# Patient Record
Sex: FEMALE | Race: WHITE | Employment: UNEMPLOYED | URBAN - METROPOLITAN AREA
[De-identification: names, ages, dates, MRNs, and addresses within clinical notes are randomized per-mention and may not be internally consistent; named-entity substitution may affect disease eponyms.]

---

## 2017-04-11 ENCOUNTER — HOSPITAL ENCOUNTER (OUTPATIENT)
Dept: PHYSICAL THERAPY | Age: 62
Discharge: HOME | End: 2017-04-11
Payer: COMMERCIAL

## 2017-04-11 PROCEDURE — 97161 PT EVAL LOW COMPLEX 20 MIN: CPT
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
## 2017-04-11 NOTE — PROGRESS NOTES
Ambulatory/Rehab Services H2 Model Falls Risk Assessment    Risk Factor Pts. ·   Confusion/Disorientation/Impulsivity  []    4 ·   Symptomatic Depression  []   2 ·   Altered Elimination  []   1 ·   Dizziness/Vertigo  []   1 ·   Gender (Male)  []   1 ·   Any administered antiepileptics (anticonvulsants):  []   2 ·   Any administered benzodiazepines:  []   1 ·   Visual Impairment (specify):  []   1 ·   Portable Oxygen Use  []   1 ·   Orthostatic ? BP  []   1 ·   History of Recent Falls (within 3 mos.)  []   5     Ability to Rise from Chair (choose one) Pts. ·   Ability to rise in a single movement  [x]   0 ·   Pushes up, successful in one attempt  []   1 ·   Multiple attempts, but successful  []   3 ·   Unable to rise without assistance  []   4   Total: (5 or greater = High Risk) 0     Falls Prevention Plan:   []                Physical Limitations to Exercise (specify):   []                Mobility Assistance Device (type):   []                Exercise/Equipment Adaptation (specify):    ©2010 [facility] [address] Patent #[id number].  Federal Law prohibits the replication, distribution or use without written permission from [facility]

## 2017-04-20 ENCOUNTER — APPOINTMENT (OUTPATIENT)
Dept: PHYSICAL THERAPY | Age: 62
End: 2017-04-20
Payer: COMMERCIAL

## 2017-04-24 ENCOUNTER — APPOINTMENT (OUTPATIENT)
Dept: PHYSICAL THERAPY | Age: 62
End: 2017-04-24
Payer: COMMERCIAL

## 2017-05-04 ENCOUNTER — HOSPITAL ENCOUNTER (OUTPATIENT)
Dept: PHYSICAL THERAPY | Age: 62
Discharge: HOME | End: 2017-05-04
Payer: COMMERCIAL

## 2017-05-04 PROCEDURE — 97110 THERAPEUTIC EXERCISES: CPT

## 2017-05-04 PROCEDURE — 97164 PT RE-EVAL EST PLAN CARE: CPT
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
## 2017-07-13 NOTE — PROGRESS NOTES
Pili Hernandez  : 1955 [facility] at [address] [place] [address]  [phone]   [phone]          OUTPATIENT PHYSICAL THERAPY:Discharge and Discontinuation Summary 2017    ICD-10: Treatment Diagnosis: Patellofemoral pain, left knee, M22.2X2  Precautions/Allergies:   Review of patient's allergies indicates no known allergies. Fall Risk Score: 0 (? 5 = High Risk)  MD Orders: Evaluate and Treat MEDICAL/REFERRING DIAGNOSIS:  Nondisplaced transverse fracture of left patella, initial encounter for closed fracture [S82.035A]  Pain in left knee [M25.562]   DATE OF ONSET:   REFERRING PHYSICIAN: Barbara York MD  RETURN PHYSICIAN APPOINTMENT: not known     INITIAL ASSESSMENT:  Pili Hernandez has been seen for 2 visits from 17 to 17 for left patella fracture. Patient has performed therapeutic exercises, activities, and had manual therapy to increased strength, ROM and function. Patient has also used modalities for pain control in order to increase function. Patient has show an increase in function per the LEFS with scores of 62/80. Patient has not returned for follow up at this time and will be discharged. She partially met her goals for therapy. Please re-order therapy if further is needed. Thank you for the referral.  Yady Amaya, PT, DPT, OCS  2017     PROBLEM LIST (Impacting functional limitations):  1. Decreased Strength  2. Decreased ADL/Functional Activities  3. Decreased Transfer Abilities  4. Decreased Ambulation Ability/Technique  5. Decreased Balance  6. Increased Pain  7. Decreased Activity Tolerance  8. Decreased Flexibility/Joint Mobility INTERVENTIONS PLANNED:  1. Balance Exercise  2. Cold  3. Electrical Stimulation  4. Manual Therapy  5. Neuromuscular Re-education/Strengthening  6. Range of Motion (ROM)  7. Therapeutic Activites  8.  Therapeutic Exercise/Strengthening   TREATMENT PLAN:  Effective Dates: 5/4/17 TO 6/4/17. Frequency/Duration: 1 time a month for follow up  GOALS: (Goals have been discussed and agreed upon with patient.)  Short-Term Functional Goals: Time Frame: 2 weeks  1. Patient will report a greater than 50% improvement in left knee symptoms in order to return to ADL's (MET)  2. Patient will show full knee flexion in order to return to all activities(MET)  3. Patient will be independent in all HEP for left knee mobility(MET)  Discharge Goals: Time Frame: 4 weeks  4. Patient will report a greater than 75% improvement in left knee symptoms in order to return to ADL's (MET  5. Patient will report going for a moderate difficulty hike without pain in order to return to activity(MET)  6. Patient will show a greater than 5 point increase on the LEFS in order to show an increase in function (not met)  7. Patient will be independent in all HEP for left knee mobility and stability (not met)  Rehabilitation Potential For Stated Goals: Excellent  Regarding Shelley Garsia therapy, I certify that the treatment plan above will be carried out by a therapist or under their direction.   Thank you for this referral,  Daphne Woods PT     Referring Physician Signature: Lia Jose MD              Date

## 2017-09-11 ENCOUNTER — APPOINTMENT (RX ONLY)
Dept: URBAN - METROPOLITAN AREA CLINIC 349 | Facility: CLINIC | Age: 62
Setting detail: DERMATOLOGY
End: 2017-09-11

## 2017-11-28 ENCOUNTER — HOSPITAL ENCOUNTER (OUTPATIENT)
Dept: PHYSICAL THERAPY | Age: 62
Discharge: HOME | End: 2017-11-28
Payer: COMMERCIAL

## 2017-11-28 PROCEDURE — 97110 THERAPEUTIC EXERCISES: CPT

## 2017-11-28 PROCEDURE — 97161 PT EVAL LOW COMPLEX 20 MIN: CPT

## 2017-11-29 NOTE — THERAPY EVALUATION
Ambulatory/Rehab Services H2 Model Falls Risk Assessment    Risk Factor Pts. ·   Confusion/Disorientation/Impulsivity  []    4 ·   Symptomatic Depression  []   2 ·   Altered Elimination  []   1 ·   Dizziness/Vertigo  []   1 ·   Gender (Male)  []   1 ·   Any administered antiepileptics (anticonvulsants):  []   2 ·   Any administered benzodiazepines:  []   1 ·   Visual Impairment (specify):  []   1 ·   Portable Oxygen Use  []   1 ·   Orthostatic ? BP  []   1 ·   History of Recent Falls (within 3 mos.)  []   5     Ability to Rise from Chair (choose one) Pts. ·   Ability to rise in a single movement  []   0 ·   Pushes up, successful in one attempt  []   1 ·   Multiple attempts, but successful  []   3 ·   Unable to rise without assistance  []   4   Total: (5 or greater = High Risk) 0     Falls Prevention Plan:   []                Physical Limitations to Exercise (specify):   []                Mobility Assistance Device (type):   []                Exercise/Equipment Adaptation (specify):    ©2010 [facility] [address] Patent #[id number].  Federal Law prohibits the replication, distribution or use without written permission from [facility] [employer]

## 2017-11-29 NOTE — THERAPY EVALUATION
Fuad Mnóica  : 1955  Primary: [facility]  Secondary:  Therapy Center at [address] [place] [address]  [phone]   [phone]       OUTPATIENT PHYSICAL THERAPY:Initial Assessment 2017    ICD-10: Treatment Diagnosis: Cervicalgia (M54.2), Pain in Left Shoulder (M25.512)  Precautions/Allergies: None noted  Fall Risk Score: 0 (? 5 = High Risk)  MD Orders: Evaluate and treat MEDICAL/REFERRING DIAGNOSIS:Neck pain [M54.2]  Shoulder pain [M25.519]  DATE OF ONSET: 3 weeks ago  REFERRING PHYSICIAN:Referred, Self, MD  RETURN PHYSICIAN APPOINTMENT: 17     INITIAL ASSESSMENT:  Mrs. Rahul Ortiz presents to physical therapy with increased pain in her neck and shoulder, as well as headaches, limited ROM and decreased strength in her upper extremities. The examination reveals a lack of mobility in the joints of her upper, middle and lower cervical spine, as well as her upper and middle thoracic spine, tightness of the soft tissue in her cervical and thoracic region, slight weakness in the upper extremities. The examination is of low complexity due to the lack of other contributing factors. Skilled physical therapy is recommended to progress the plan of care in order for the patient to return to full function with minimal symptoms. PROBLEM LIST (Impacting functional limitations):  1. Decreased Strength  2. Decreased ADL/Functional Activities  3. Increased Pain  4. Decreased Activity Tolerance  5. Decreased Flexibility/Joint Mobility  6. Decreased [place] with Home Exercise Program INTERVENTIONS PLANNED:  1. Cold  2. Heat  3. Home Exercise Program (HEP)  4. Manual Therapy  5. Range of Motion (ROM)  6. Therapeutic Exercise/Strengthening     TREATMENT PLAN:  Effective Dates: 17 TO 18.     Frequency/Duration: 2 times a week for 4 weeks  GOALS: (Goals have been discussed and agreed upon with patient.)  SHORT-TERM FUNCTIONAL GOALS: Time Frame: 2-4 wks  1. Patient will report 25-50% reduction in overall symptoms  2. Patient will be compliant with HEP and plan of care  3. Patient will have left cervical rotation ROM equal to the right  4. Patient will improve on the NDIby 3-5 points  DISCHARGE GOALS: Time Frame: 6-8 wk  1. Patient will report % reduction in overall symptoms in order to be able to have full function with decreased symptoms  2. Patient will hike for > 1hr with no pain in the shoulder or neck  3. Patient will haven no headaches during a 3-4 week period  4. Patient will improve on the NDI by 8-10 points in order to demonstrate improved function with less pain    Rehabilitation Potential For Stated Goals: Good            HISTORY:   History of Present Injury/Illness (Reason for Referral):  Mrs. Shira Inman comes to therapy following a 10-year history of on and off neck pain. She complains that the pain is throughout her neck and into her upper trapezius, as well as along her medial scapula border and in her thoracic spine. She also states that starting 3 weeks ago, she had an insidious onset of left shoulder pain. The pain is limiting her from exercises, specifically lifting weights and working on the upper extremities. The pain in her neck causes headaches that can last 1-3 days that she has at least once every 2-4 weeks. She has limited ROM of the joints in her upper, middle and lower cervical spine as well as in her upper and middle thoracic spine. She has slight weaknesses in her left upper extremities, mostly due to pain.   Past Medical History/Comorbidities: Patellar Fx  Social History/Living Environment: Lives with spouse, retired  Prior Level of Function/Work/Activity: Exercises regularly (hiking, gym)  Current Medications:   None  Date Last Reviewed:  11/29/2017   Number of Personal Factors/Comorbidities that affect the Plan of Care: 0: LOW COMPLEXITY   EXAMINATION:   (Abbreviations: *-pain, NP- no pain, wnl-within normal limits)  Observation/Orthostatic Postural Assessment:    Standing resting posture:  · Rounded shoulders, forward head, increased upper thoracic kyphosis, scapular winging, L scapular elevation, right side bend at CT junction, right convexity in the thoracic spine from CT junction-T8    Sitting resting posture:  · Rounded shoulders, forward head, increased upper thoracic kyphosis, scapular winging, L scapular elevation, right side bend at CT junction, right convexity in the thoracic spine from CT junction-T8    Palpation/tone/tissue texture:    Soft tissue:  · Upper traps: increased tone bilaterally up to occipit  · Levatore scapulae: increased tone  · Suboccipitals: Increased tone  · Thoracic Paraspinals: increased tone on left    PAIVM: (passive accessory intervertebral motion)  · C0-C1: restricted in L rotation  · C1-C2: restricted on left  · Mid Cervical: restricted on left  · Lower Cervical: restricted on left  · Upper thoracic: restricted on left  · Mid thoracic: restricted on left      ROM:  (P-pain  NP-no pain)  Cervical ROM  (tested in sitting) Date:  11-28-17       Flexion -   Extension Restricted   Rotation L Restricted   Rotation R WNL   Quadrant exten Pain on L   Quadrant flex WNL     Shoulder ROM Date:  11-28-17       Right Left   Flexion WNL WNL*   Abduction WNL WNL*   IR WNL WNL   ER WNL WNL    Thoracic ROM  (tested in sitting) Date:  11-28-17       Flexion WNL   Extension Limited   Rotation L Limited   Rotation R WNL           Strength:   Shoulder strength Date:  11-28-17       Right Left   Flexion 5 5   Abduction 5 5   IR 5 5   ER 5 5 @ neutral  4* @ 90 deg        Scapular stability Date:  11-28-17       Right Left   Mid trap WNL Weakness, substitution of upper trap   Low trap WNL Weakness   Rhomboids WNL Weakness     Cervical Strength Date:  11-28-17   Chin tuck: Able to perform, good firing             Special Tests:   Alar ligament stress test: (-) bilateral  Neurological Screen:   Myotomes: WNL         Dermatomes: WNL         Reflexes: All normal         Neural Tension Tests: ULTT (median):   Functional Mobility:    Patient has increased scapular winging with overhead motions     Body Structures Involved:  1. Joints  2. Muscles Body Functions Affected:  1. Sensory/Pain  2. Neuromusculoskeletal  3. Movement Related Activities and Participation Affected:  1. General Tasks and Demands  2. Domestic Life  3. Community, Social and [place]   Number of elements that affect the Plan of Care: 3: MODERATE COMPLEXITY   CLINICAL PRESENTATION:   Presentation: Stable and uncomplicated: LOW COMPLEXITY   CLINICAL DECISION MAKING:   Outcome Measure: Tool Used: Neck Disability Index (NDI)  Score:  Initial: 14/50  Most Recent: X/50 (Date: -- )   Interpretation of Score: The Neck Disability Index is a revised form of the Oswestry Low Back Pain Index and is designed to measure the activities of daily living in person's with neck pain. Each section is scored on a 0-5 scale, 5 representing the greatest disability. The scores of each section are added together for a total score of 50. Score 0 1-10 11-20 21-30 31-40 41-49 50   Modifier  CI CJ CK CL CM CN     Tool Used: Disabilities of the Arm, Shoulder and Hand (DASH) Questionnaire - Quick Version  Score:  Initial: 28/55  Most Recent: X/55 (Date: -- )   Interpretation of Score: The DASH is designed to measure the activities of daily living in person's with upper extremity dysfunction or pain. Each section is scored on a 1-5 scale, 5 representing the greatest disability. The scores of each section are added together for a total score of 55. Score 11 12-19 20-28 29-37 38-45 46-54 55   Modifier  CI CJ CK CL CM CN     Medical Necessity:   · Patient is expected to demonstrate progress in strength, range of motion and symptom levels to return to full function.   Reason for Services/Other Comments:  · Patient continues to require skilled intervention due to ongoing symptoms. Use of outcome tool(s) and clinical judgement create a POC that gives a: Clear prediction of patient's progress: LOW COMPLEXITY   TREATMENT:   (In addition to Assessment/Re-Assessment sessions the following treatments were rendered)    Subjective Pre-Treatment Symptoms: Patient presents to PT today complaining of increased pain and tightness in her neck and upper back, as well as in her traps and pain in her left shoulder. She states that overhead activity increases the pain in her left shoulder and that her neck pain has been on and off for 10 years. Therapeutic Exercise: (15 min) Done in order to improve strength, ROM and understanding of current condition. Date:  11-28-17   Activity/Exercise Parameters   Education Discussed examination findings, HEP, plan of care, postural corrections           Manual Therapy: (-) Done in order to improve joint and soft tissue mobility,reduce muscle guarding, and decrease muscle tone    Modalities: (-) Done in order to reduce swelling and pain    Treatment/Session Assessment:  Patient tolerated the session well. She had no increased pain during the session and understood all exam findings reported to her. She was given an HEP and plan of care were discussed. · Pain: Initial:    1-2/10 Post Session:  1-2/10 ·   Compliance with Program/Exercises: Will assess as treatment progresses. · Recommendations/Intent for next treatment session: \"Next visit will focus on progressing the patients plan of care\".   Future Appointments  Date Time Provider Poonam Zapata   11/30/2017 1:00 PM Amada Cole, PT, DPT [facility]     Total Treatment Duration: 90 min; 70 min evaluation, 15 min there-ex  PT Patient Time In/Time Out  Time In: 1400  Time Out: [address] PT, DPT

## 2017-11-30 ENCOUNTER — HOSPITAL ENCOUNTER (OUTPATIENT)
Dept: PHYSICAL THERAPY | Age: 62
Discharge: HOME | End: 2017-11-30
Payer: COMMERCIAL

## 2017-11-30 PROCEDURE — 97110 THERAPEUTIC EXERCISES: CPT

## 2017-11-30 PROCEDURE — 97140 MANUAL THERAPY 1/> REGIONS: CPT

## 2017-11-30 NOTE — PROGRESS NOTES
Bailee Herbert  : 1955  Primary: [place] [facility]  Secondary:  Therapy Center at [address] [place] [address]  [phone]   [phone]       OUTPATIENT PHYSICAL THERAPY:Daily Note 2017    ICD-10: Treatment Diagnosis: Cervicalgia (M54.2), Pain in Left Shoulder (M25.512)  Precautions/Allergies: None noted  Fall Risk Score: 0 (? 5 = High Risk)  MD Orders: Evaluate and treat MEDICAL/REFERRING DIAGNOSIS:Neck pain [M54.2]  Shoulder pain [M25.519]  DATE OF ONSET: 3 weeks ago  REFERRING PHYSICIAN:Referred, Self, MD  RETURN PHYSICIAN APPOINTMENT: 17     INITIAL ASSESSMENT:  Mrs. Tami Feldman presents to physical therapy with increased pain in her neck and shoulder, as well as headaches, limited ROM and decreased strength in her upper extremities. The examination reveals a lack of mobility in the joints of her upper, middle and lower cervical spine, as well as her upper and middle thoracic spine, tightness of the soft tissue in her cervical and thoracic region, slight weakness in the upper extremities. The examination is of low complexity due to the lack of other contributing factors. Skilled physical therapy is recommended to progress the plan of care in order for the patient to return to full function with minimal symptoms. PROBLEM LIST (Impacting functional limitations):  1. Decreased Strength  2. Decreased ADL/Functional Activities  3. Increased Pain  4. Decreased Activity Tolerance  5. Decreased Flexibility/Joint Mobility  6. Decreased [place] with Home Exercise Program INTERVENTIONS PLANNED:  1. Cold  2. Heat  3. Home Exercise Program (HEP)  4. Manual Therapy  5. Range of Motion (ROM)  6. Therapeutic Exercise/Strengthening     TREATMENT PLAN:  Effective Dates: 17 TO 18. Frequency/Duration: 2 times a week for 4 weeks  GOALS: (Goals have been discussed and agreed upon with patient.)  SHORT-TERM FUNCTIONAL GOALS: Time Frame: 2-4 wks  1. Patient will report 25-50% reduction in overall symptoms  2. Patient will be compliant with HEP and plan of care  3. Patient will have left cervical rotation ROM equal to the right  4. Patient will improve on the NDIby 3-5 points  DISCHARGE GOALS: Time Frame: 6-8 wk  1. Patient will report % reduction in overall symptoms in order to be able to have full function with decreased symptoms  2. Patient will hike for > 1hr with no pain in the shoulder or neck  3. Patient will haven no headaches during a 3-4 week period  4. Patient will improve on the NDI by 8-10 points in order to demonstrate improved function with less pain    Rehabilitation Potential For Stated Goals: Good            HISTORY:   History of Present Injury/Illness (Reason for Referral):  Mrs. Edil Ku comes to therapy following a 10-year history of on and off neck pain. She complains that the pain is throughout her neck and into her upper trapezius, as well as along her medial scapula border and in her thoracic spine. She also states that starting 3 weeks ago, she had an insidious onset of left shoulder pain. The pain is limiting her from exercises, specifically lifting weights and working on the upper extremities. The pain in her neck causes headaches that can last 1-3 days that she has at least once every 2-4 weeks. She has limited ROM of the joints in her upper, middle and lower cervical spine as well as in her upper and middle thoracic spine. She has slight weaknesses in her left upper extremities, mostly due to pain.   Past Medical History/Comorbidities: Patellar Fx  Social History/Living Environment: Lives with spouse, retired  Prior Level of Function/Work/Activity: Exercises regularly (hiking, gym)  Current Medications:   None  Date Last Reviewed:  11/30/2017   EXAMINATION:   (Abbreviations: *-pain, NP- no pain, wnl-within normal limits)  Observation/Orthostatic Postural Assessment:    Standing resting posture:  · Rounded shoulders, forward head, increased upper thoracic kyphosis, scapular winging, L scapular elevation, right side bend at CT junction, right convexity in the thoracic spine from CT junction-T8    Sitting resting posture:  · Rounded shoulders, forward head, increased upper thoracic kyphosis, scapular winging, L scapular elevation, right side bend at CT junction, right convexity in the thoracic spine from CT junction-T8    Palpation/tone/tissue texture:    Soft tissue:  · Upper traps: increased tone bilaterally up to occipit  · Levatore scapulae: increased tone  · Suboccipitals: Increased tone  · Thoracic Paraspinals: increased tone on left    PAIVM: (passive accessory intervertebral motion)  · C0-C1: restricted in L rotation  · C1-C2: restricted on left  · Mid Cervical: restricted on left  · Lower Cervical: restricted on left  · Upper thoracic: restricted on left  · Mid thoracic: restricted on left      ROM:  (P-pain  NP-no pain)  Cervical ROM  (tested in sitting) Date:  11-28-17       Flexion -   Extension Restricted   Rotation L Restricted   Rotation R WNL   Quadrant exten Pain on L   Quadrant flex WNL     Shoulder ROM Date:  11-28-17       Right Left   Flexion WNL WNL*   Abduction WNL WNL*   IR WNL WNL   ER WNL WNL    Thoracic ROM  (tested in sitting) Date:  11-28-17       Flexion WNL   Extension Limited   Rotation L Limited   Rotation R WNL           Strength:   Shoulder strength Date:  11-28-17       Right Left   Flexion 5 5   Abduction 5 5   IR 5 5   ER 5 5 @ neutral  4* @ 90 deg        Scapular stability Date:  11-28-17       Right Left   Mid trap WNL Weakness, substitution of upper trap   Low trap WNL Weakness   Rhomboids WNL Weakness     Cervical Strength Date:  11-28-17   Chin tuck: Able to perform, good firing             Special Tests:   Alar ligament stress test: (-) bilateral  Neurological Screen:   Myotomes: WNL         Dermatomes: WNL         Reflexes:  All normal         Neural Tension Tests: ULTT (median): Functional Mobility:    Patient has increased scapular winging with overhead motions     CLINICAL DECISION MAKING:   Outcome Measure: Tool Used: Neck Disability Index (NDI)  Score:  Initial: 14/50  Most Recent: X/50 (Date: -- )   Interpretation of Score: The Neck Disability Index is a revised form of the Oswestry Low Back Pain Index and is designed to measure the activities of daily living in person's with neck pain. Each section is scored on a 0-5 scale, 5 representing the greatest disability. The scores of each section are added together for a total score of 50. Score 0 1-10 11-20 21-30 31-40 41-49 50   Modifier CH CI CJ CK CL CM CN     Tool Used: Disabilities of the Arm, Shoulder and Hand (DASH) Questionnaire - Quick Version  Score:  Initial: 28/55  Most Recent: X/55 (Date: -- )   Interpretation of Score: The DASH is designed to measure the activities of daily living in person's with upper extremity dysfunction or pain. Each section is scored on a 1-5 scale, 5 representing the greatest disability. The scores of each section are added together for a total score of 55. Score 11 12-19 20-28 29-37 38-45 46-54 55   Modifier CH CI CJ CK CL CM CN     Medical Necessity:   · Patient is expected to demonstrate progress in strength, range of motion and symptom levels to return to full function. Reason for Services/Other Comments:  · Patient continues to require skilled intervention due to ongoing symptoms. TREATMENT:   (In addition to Assessment/Re-Assessment sessions the following treatments were rendered)    Subjective Pre-Treatment Symptoms: Patient reports that she had a lot of pain yesterday and isn't sure what brought that on. Today she feels pretty good. Therapeutic Exercise: (25 min) Done in order to improve strength, ROM and understanding of current condition.     Date:  11-28-17 Date  11-30-17   Activity/Exercise Parameters    Education Discussed examination findings, HEP, plan of care, postural corrections Discussed HEP   Self thoracic extension   Using foam roll at 3 spots on mid mid thoracic   Diaphragmatic breathing  Supine, 90/90 position, worked on lateral and up/down breathing   Rib cage expansion  In R side-lying, raising left arm overhead while breathing at 1:2 ratio and also with forward reaching   W's  Set shoulder blade first, 10x, 2 sets, yellow band     Manual Therapy: (30 min) Done in order to improve joint and soft tissue mobility,reduce muscle guarding, and decrease muscle tone  · Soft tissue mobilization along the left rib cage  · PA at L TP's and rotational mobilization at [facility] in the mid thoracic spine   · Rib expansion on L side, done with breathing    Modalities: (10 min) Done in order to reduce swelling and pain  · Ice to mid thoracic x 10 min  Treatment/Session Assessment:  Patient tolerated the session well. She had improved thoracic and costal cage mobility after the session. Discussed her home program.  · Pain: Initial:    Minimal symptoms at rest Post Session:  Minimal symptoms at rest ·   Compliance with Program/Exercises: Will assess as treatment progresses. · Recommendations/Intent for next treatment session: \"Next visit will focus on progressing the patients plan of care\".   Future Appointments  Date Time Provider Poonam Zapata   12/5/2017 10:30 AM Shanelle Ly PT, DPT [facility]   12/7/2017 8:30 AM Shanelle Ly PT, DPT [facility]   12/12/2017 8:30 AM Shanelle Ly PT, DPANDRE [facility]   12/19/2017 10:30 AM Shanelle Ly PT, DPT [facility]     Total Treatment Duration: 65 min  PT Patient Time In/Time Out  Time In: 1300  Time Out: 300 [facility] PT, DPT

## 2017-12-05 ENCOUNTER — HOSPITAL ENCOUNTER (OUTPATIENT)
Dept: PHYSICAL THERAPY | Age: 62
Discharge: HOME | End: 2017-12-05
Payer: COMMERCIAL

## 2017-12-05 PROCEDURE — 97140 MANUAL THERAPY 1/> REGIONS: CPT

## 2017-12-05 PROCEDURE — 97110 THERAPEUTIC EXERCISES: CPT

## 2017-12-05 NOTE — PROGRESS NOTES
Fuad Ly  : 1955  Primary: Rosita Zuñiga Ppo  Secondary:  [facility]rapy [facility] at [facility]  [address] [place] [address]  [phone]   [phone]       OUTPATIENT PHYSICAL THERAPY:Daily Note 2017    ICD-10: Treatment Diagnosis: Cervicalgia (M54.2), Pain in Left Shoulder (M25.512)  Precautions/Allergies: None noted  Fall Risk Score: 0 (? 5 = High Risk)  MD Orders: Evaluate and treat MEDICAL/REFERRING DIAGNOSIS:Neck pain [M54.2]  Shoulder pain [M25.519]  DATE OF ONSET: 3 weeks ago  REFERRING PHYSICIAN:Referred, Self, MD  RETURN PHYSICIAN APPOINTMENT: 17     INITIAL ASSESSMENT:  Mrs. Rahul Ortiz presents to physical therapy with increased pain in her neck and shoulder, as well as headaches, limited ROM and decreased strength in her upper extremities. The examination reveals a lack of mobility in the joints of her upper, middle and lower cervical spine, as well as her upper and middle thoracic spine, tightness of the soft tissue in her cervical and thoracic region, slight weakness in the upper extremities. The examination is of low complexity due to the lack of other contributing factors. Skilled physical therapy is recommended to progress the plan of care in order for the patient to return to full function with minimal symptoms. PROBLEM LIST (Impacting functional limitations):  1. Decreased Strength  2. Decreased ADL/Functional Activities  3. Increased Pain  4. Decreased Activity Tolerance  5. Decreased Flexibility/Joint Mobility  6. Decreased [place] with Home Exercise Program INTERVENTIONS PLANNED:  1. Cold  2. Heat  3. Home Exercise Program (HEP)  4. Manual Therapy  5. Range of Motion (ROM)  6. Therapeutic Exercise/Strengthening     TREATMENT PLAN:  Effective Dates: 17 TO 18. Frequency/Duration: 2 times a week for 4 weeks  GOALS: (Goals have been discussed and agreed upon with patient.)  SHORT-TERM FUNCTIONAL GOALS: Time Frame: 2-4 wks  1. Patient will report 25-50% reduction in overall symptoms  2. Patient will be compliant with HEP and plan of care  3. Patient will have left cervical rotation ROM equal to the right  4. Patient will improve on the NDIby 3-5 points  DISCHARGE GOALS: Time Frame: 6-8 wk  1. Patient will report % reduction in overall symptoms in order to be able to have full function with decreased symptoms  2. Patient will hike for > 1hr with no pain in the shoulder or neck  3. Patient will haven no headaches during a 3-4 week period  4. Patient will improve on the NDI by 8-10 points in order to demonstrate improved function with less pain    Rehabilitation Potential For Stated Goals: Good            HISTORY:   History of Present Injury/Illness (Reason for Referral):  Mrs. Clarisse Aguillon comes to therapy following a 10-year history of on and off neck pain. She complains that the pain is throughout her neck and into her upper trapezius, as well as along her medial scapula border and in her thoracic spine. She also states that starting 3 weeks ago, she had an insidious onset of left shoulder pain. The pain is limiting her from exercises, specifically lifting weights and working on the upper extremities. The pain in her neck causes headaches that can last 1-3 days that she has at least once every 2-4 weeks. She has limited ROM of the joints in her upper, middle and lower cervical spine as well as in her upper and middle thoracic spine. She has slight weaknesses in her left upper extremities, mostly due to pain.   Past Medical History/Comorbidities: Patellar Fx  Social History/Living Environment: Lives with spouse, retired  Prior Level of Function/Work/Activity: Exercises regularly (hiking, gym)  Current Medications:   None  Date Last Reviewed:  12/5/2017   EXAMINATION:   (Abbreviations: *-pain, NP- no pain, wnl-within normal limits)  Observation/Orthostatic Postural Assessment:    Standing resting posture:  · Rounded shoulders, forward head, increased upper thoracic kyphosis, scapular winging, L scapular elevation, right side bend at CT junction, right convexity in the thoracic spine from CT junction-T8    Sitting resting posture:  · Rounded shoulders, forward head, increased upper thoracic kyphosis, scapular winging, L scapular elevation, right side bend at CT junction, right convexity in the thoracic spine from CT junction-T8    Palpation/tone/tissue texture:    Soft tissue:  · Upper traps: increased tone bilaterally up to occipit  · Levatore scapulae: increased tone  · Suboccipitals: Increased tone  · Thoracic Paraspinals: increased tone on left    PAIVM: (passive accessory intervertebral motion)  · C0-C1: restricted in L rotation  · C1-C2: restricted on left  · Mid Cervical: restricted on left  · Lower Cervical: restricted on left  · Upper thoracic: restricted on left  · Mid thoracic: restricted on left      ROM:  (P-pain  NP-no pain)  Cervical ROM  (tested in sitting) Date:  11-28-17       Flexion -   Extension Restricted   Rotation L Restricted   Rotation R WNL   Quadrant exten Pain on L   Quadrant flex WNL     Shoulder ROM Date:  11-28-17       Right Left   Flexion WNL WNL*   Abduction WNL WNL*   IR WNL WNL   ER WNL WNL    Thoracic ROM  (tested in sitting) Date:  11-28-17       Flexion WNL   Extension Limited   Rotation L Limited   Rotation R WNL           Strength:   Shoulder strength Date:  11-28-17       Right Left   Flexion 5 5   Abduction 5 5   IR 5 5   ER 5 5 @ neutral  4* @ 90 deg        Scapular stability Date:  11-28-17       Right Left   Mid trap WNL Weakness, substitution of upper trap   Low trap WNL Weakness   Rhomboids WNL Weakness     Cervical Strength Date:  11-28-17   Chin tuck: Able to perform, good firing             Special Tests:   Alar ligament stress test: (-) bilateral  Neurological Screen:   Myotomes: WNL         Dermatomes: WNL         Reflexes:  All normal         Neural Tension Tests: ULTT (median):   Functional Mobility:    Patient has increased scapular winging with overhead motions     CLINICAL DECISION MAKING:   Outcome Measure: Tool Used: Neck Disability Index (NDI)  Score:  Initial: 14/50  Most Recent: X/50 (Date: -- )   Interpretation of Score: The Neck Disability Index is a revised form of the Oswestry Low Back Pain Index and is designed to measure the activities of daily living in person's with neck pain. Each section is scored on a 0-5 scale, 5 representing the greatest disability. The scores of each section are added together for a total score of 50. Score 0 1-10 11-20 21-30 31-40 41-49 50   Modifier CH CI CJ CK CL CM CN     Tool Used: Disabilities of the Arm, Shoulder and Hand (DASH) Questionnaire - Quick Version  Score:  Initial: 28/55  Most Recent: X/55 (Date: -- )   Interpretation of Score: The DASH is designed to measure the activities of daily living in person's with upper extremity dysfunction or pain. Each section is scored on a 1-5 scale, 5 representing the greatest disability. The scores of each section are added together for a total score of 55. Score 11 12-19 20-28 29-37 38-45 46-54 55   Modifier CH CI CJ CK CL CM CN     Medical Necessity:   · Patient is expected to demonstrate progress in strength, range of motion and symptom levels to return to full function. Reason for Services/Other Comments:  · Patient continues to require skilled intervention due to ongoing symptoms. TREATMENT:   (In addition to Assessment/Re-Assessment sessions the following treatments were rendered)    Subjective Pre-Treatment Symptoms: Patient reports that she did okay after our last session with minimal soreness. She also had a massage that Friday. On Saturday she developed a headache that lasted most of the day. Today she feels better. Therapeutic Exercise: (25 min) Done in order to improve strength, ROM and understanding of current condition.     Date:  11-28-17 Date  11-30-17 Date  12-5-17 Activity/Exercise Parameters     Education Discussed examination findings, HEP, plan of care, postural corrections Discussed HEP · Discussed HEP  · Discussed her sleeping posture in detail    Self thoracic extension   Using foam roll at 3 spots on mid mid thoracic Discussed and demonstrated form   Diaphragmatic breathing  Supine, 90/90 position, worked on lateral and up/down breathing discussed   Rib cage expansion  In R side-lying, raising left arm overhead while breathing at 1:2 ratio and also with forward reaching In R side-lying, raising left arm overhead while breathing at 1:2 ratio and also with forward reaching   W's  Set shoulder blade first, 10x, 2 sets, yellow band discussed   Rotator cuff stabilization    IR at 90/90: eccentric control, 10-20 se, 10x   Thoracic rotation   Bilateral, hips maximally flexed, 10x     Manual Therapy: (30 min) Done in order to improve joint and soft tissue mobility,reduce muscle guarding, and decrease muscle tone  · Soft tissue mobilization along the left rib cage  · PA at L TP's and rotational mobilization at [facility] in the mid thoracic spine   · Upper, mid and lower thoracic mobilization into extension, grade III-IV  · Rib expansion on L side, done with breathing    Modalities: (-) Done in order to reduce swelling and pain    Treatment/Session Assessment:  Patient tolerated the session well. She had moderate thoracic rotation restrictions so this was added to her home program. Also discussed her sleeping psoture in detail. Discussed her updated home program and plan of care. · Pain: Initial:    Minimal symptoms at rest Post Session:  Minimal symptoms at rest ·   Compliance with Program/Exercises: Will assess as treatment progresses. · Recommendations/Intent for next treatment session: \"Next visit will focus on progressing the patients plan of care\".   Future Appointments  Date Time Provider Poonam Zapata   12/7/2017 8:30 AM Eliane Ayala, PT, DPT [facility] 12/12/2017 8:30 AM Lorena Farrell, PT, DPT [facility]   12/19/2017 10:30 AM Lorena Farrell PT, DPT [facility]     Total Treatment Duration: 60 min  PT Patient Time In/Time Out  Time In: 1030  Time Out: [address] PT, DPT

## 2017-12-07 ENCOUNTER — HOSPITAL ENCOUNTER (OUTPATIENT)
Dept: PHYSICAL THERAPY | Age: 62
Discharge: HOME | End: 2017-12-07
Payer: COMMERCIAL

## 2017-12-07 PROCEDURE — 97110 THERAPEUTIC EXERCISES: CPT

## 2017-12-07 PROCEDURE — 97140 MANUAL THERAPY 1/> REGIONS: CPT

## 2017-12-07 NOTE — PROGRESS NOTES
Kristian Barron  : 1955  Primary: Howard Montgomery Ppo  Secondary:  Therapy Center at [address] [place] [address]  [phone]   [phone]       OUTPATIENT PHYSICAL THERAPY:Daily Note 2017    ICD-10: Treatment Diagnosis: Cervicalgia (M54.2), Pain in Left Shoulder (M25.512)  Precautions/Allergies: None noted  Fall Risk Score: 0 (? 5 = High Risk)  MD Orders: Evaluate and treat MEDICAL/REFERRING DIAGNOSIS:Neck pain [M54.2]  Shoulder pain [M25.519]  DATE OF ONSET: 3 weeks ago  REFERRING PHYSICIAN:Referred, Self, MD  RETURN PHYSICIAN APPOINTMENT: 17     INITIAL ASSESSMENT:  Mrs. Kush Ledesma presents to physical therapy with increased pain in her neck and shoulder, as well as headaches, limited ROM and decreased strength in her upper extremities. The examination reveals a lack of mobility in the joints of her upper, middle and lower cervical spine, as well as her upper and middle thoracic spine, tightness of the soft tissue in her cervical and thoracic region, slight weakness in the upper extremities. The examination is of low complexity due to the lack of other contributing factors. Skilled physical therapy is recommended to progress the plan of care in order for the patient to return to full function with minimal symptoms. PROBLEM LIST (Impacting functional limitations):  1. Decreased Strength  2. Decreased ADL/Functional Activities  3. Increased Pain  4. Decreased Activity Tolerance  5. Decreased Flexibility/Joint Mobility  6. Decreased [place] with Home Exercise Program INTERVENTIONS PLANNED:  1. Cold  2. Heat  3. Home Exercise Program (HEP)  4. Manual Therapy  5. Range of Motion (ROM)  6. Therapeutic Exercise/Strengthening     TREATMENT PLAN:  Effective Dates: 17 TO 18. Frequency/Duration: 2 times a week for 4 weeks  GOALS: (Goals have been discussed and agreed upon with patient.)  SHORT-TERM FUNCTIONAL GOALS: Time Frame: 2-4 wks  1. Patient will report 25-50% reduction in overall symptoms  2. Patient will be compliant with HEP and plan of care  3. Patient will have left cervical rotation ROM equal to the right  4. Patient will improve on the NDIby 3-5 points  DISCHARGE GOALS: Time Frame: 6-8 wk  1. Patient will report % reduction in overall symptoms in order to be able to have full function with decreased symptoms  2. Patient will hike for > 1hr with no pain in the shoulder or neck  3. Patient will haven no headaches during a 3-4 week period  4. Patient will improve on the NDI by 8-10 points in order to demonstrate improved function with less pain    Rehabilitation Potential For Stated Goals: Good            HISTORY:   History of Present Injury/Illness (Reason for Referral):  Mrs. Tami Feldman comes to therapy following a 10-year history of on and off neck pain. She complains that the pain is throughout her neck and into her upper trapezius, as well as along her medial scapula border and in her thoracic spine. She also states that starting 3 weeks ago, she had an insidious onset of left shoulder pain. The pain is limiting her from exercises, specifically lifting weights and working on the upper extremities. The pain in her neck causes headaches that can last 1-3 days that she has at least once every 2-4 weeks. She has limited ROM of the joints in her upper, middle and lower cervical spine as well as in her upper and middle thoracic spine. She has slight weaknesses in her left upper extremities, mostly due to pain.   Past Medical History/Comorbidities: Patellar Fx  Social History/Living Environment: Lives with spouse, retired  Prior Level of Function/Work/Activity: Exercises regularly (hiking, gym)  Current Medications:   None  Date Last Reviewed:  12/7/2017   EXAMINATION:   (Abbreviations: *-pain, NP- no pain, wnl-within normal limits)  Observation/Orthostatic Postural Assessment:    Standing resting posture:  · Rounded shoulders, forward head, increased upper thoracic kyphosis, scapular winging, L scapular elevation, right side bend at CT junction, right convexity in the thoracic spine from CT junction-T8    Sitting resting posture:  · Rounded shoulders, forward head, increased upper thoracic kyphosis, scapular winging, L scapular elevation, right side bend at CT junction, right convexity in the thoracic spine from CT junction-T8    Palpation/tone/tissue texture:    Soft tissue:  · Upper traps: increased tone bilaterally up to occipit  · Levatore scapulae: increased tone  · Suboccipitals: Increased tone  · Thoracic Paraspinals: increased tone on left    PAIVM: (passive accessory intervertebral motion)  · C0-C1: restricted in L rotation  · C1-C2: restricted on left  · Mid Cervical: restricted on left  · Lower Cervical: restricted on left  · Upper thoracic: restricted on left  · Mid thoracic: restricted on left      ROM:  (P-pain  NP-no pain)  Cervical ROM  (tested in sitting) Date:  11-28-17       Flexion -   Extension Restricted   Rotation L Restricted   Rotation R WNL   Quadrant exten Pain on L   Quadrant flex WNL     Shoulder ROM Date:  11-28-17       Right Left   Flexion WNL WNL*   Abduction WNL WNL*   IR WNL WNL   ER WNL WNL    Thoracic ROM  (tested in sitting) Date:  11-28-17       Flexion WNL   Extension Limited   Rotation L Limited   Rotation R WNL           Strength:   Shoulder strength Date:  11-28-17       Right Left   Flexion 5 5   Abduction 5 5   IR 5 5   ER 5 5 @ neutral  4* @ 90 deg        Scapular stability Date:  11-28-17       Right Left   Mid trap WNL Weakness, substitution of upper trap   Low trap WNL Weakness   Rhomboids WNL Weakness     Cervical Strength Date:  11-28-17   Chin tuck: Able to perform, good firing             Special Tests:   Alar ligament stress test: (-) bilateral  Neurological Screen:   Myotomes: WNL         Dermatomes: WNL         Reflexes:  All normal         Neural Tension Tests: ULTT (median):   Functional Mobility:    Patient has increased scapular winging with overhead motions     CLINICAL DECISION MAKING:   Outcome Measure: Tool Used: Neck Disability Index (NDI)  Score:  Initial: 14/50  Most Recent: X/50 (Date: -- )   Interpretation of Score: The Neck Disability Index is a revised form of the Oswestry Low Back Pain Index and is designed to measure the activities of daily living in person's with neck pain. Each section is scored on a 0-5 scale, 5 representing the greatest disability. The scores of each section are added together for a total score of 50. Score 0 1-10 11-20 21-30 31-40 41-49 50   Modifier CH CI CJ CK CL CM CN     Tool Used: Disabilities of the Arm, Shoulder and Hand (DASH) Questionnaire - Quick Version  Score:  Initial: 28/55  Most Recent: X/55 (Date: -- )   Interpretation of Score: The DASH is designed to measure the activities of daily living in person's with upper extremity dysfunction or pain. Each section is scored on a 1-5 scale, 5 representing the greatest disability. The scores of each section are added together for a total score of 55. Score 11 12-19 20-28 29-37 38-45 46-54 55   Modifier CH CI CJ CK CL CM CN     Medical Necessity:   · Patient is expected to demonstrate progress in strength, range of motion and symptom levels to return to full function. Reason for Services/Other Comments:  · Patient continues to require skilled intervention due to ongoing symptoms. TREATMENT:   (In addition to Assessment/Re-Assessment sessions the following treatments were rendered)    Subjective Pre-Treatment Symptoms: Patient reports that she felt sore following the last session, but has been feeling much better today. She feels she has improved overall. Therapeutic Exercise: (30 min) Done in order to improve strength, ROM and understanding of current condition.     Date  11-30-17 Date  12-5-17 Date  12-7-17   Activity/Exercise      Education Discussed HEP · Discussed HEP  · Discussed her sleeping posture in detail  · Discussed HEP   Self thoracic extension  Using foam roll at 3 spots on mid mid thoracic Discussed and demonstrated form Discussed   Diaphragmatic breathing Supine, 90/90 position, worked on lateral and up/down breathing discussed Discussed   Rib cage expansion In R side-lying, raising left arm overhead while breathing at 1:2 ratio and also with forward reaching In R side-lying, raising left arm overhead while breathing at 1:2 ratio and also with forward reaching In R & L side-lying, raising left arm overhead while breathing at 1:2 ratio and also with forward reaching   W's Set shoulder blade first, 10x, 2 sets, yellow band discussed Discussed   Scapular Muscle Control   Sidelying: arm to 90 forward elevation, retraction and eccentric control, 2x10 each side   Rotator cuff stabilization   IR at 90/90: eccentric control, 10-20 se, 10x Supine: arm ABD to 70 deg, eccentric control, 10x, both arms  Sitting: arm ABD to 70 deg, elbow on pillow, IR w/ yellow TB, eccentric control, 10x   Thoracic rotation  Bilateral, hips maximally flexed, 10x Bilateral, hips maximally flexed, 10x     Manual Therapy: (30 min) Done in order to improve joint and soft tissue mobility,reduce muscle guarding, and decrease muscle tone  · Soft tissue mobilization along the left rib cage  · PA at L TP's and rotational mobilization at [facility] in the mid thoracic spine   · Upper, mid and lower thoracic mobilization into extension, grade III-IV  · Rib expansion on L side, done with breathing (NOT DONE TODAY)    Modalities: (-) Done in order to reduce swelling and pain    Treatment/Session Assessment:  Patient tolerated the session well. She continues to have moderate thoracic rotation restrictions, however these have lessened with her performing her complete home program. She continues to have increased mobility through her thoracic spine and L shoulder.   · Pain: Initial:    Minimal symptoms at rest Post Session:  Minimal symptoms at rest ·   Compliance with Program/Exercises: Will assess as treatment progresses. · Recommendations/Intent for next treatment session: \"Next visit will focus on progressing the patients plan of care\".   Future Appointments  Date Time Provider Poonam Zapata   12/12/2017 8:30 AM Nile Ogden, PT, DPT [facility]   12/19/2017 10:30 AM Nile Ogden, PT, DPT [facility]     Total Treatment Duration: 60 min  PT Patient Time In/Time Out  Time In: 0830  Time Out: 0930    Mason Lugo PT, DPT

## 2017-12-12 ENCOUNTER — HOSPITAL ENCOUNTER (OUTPATIENT)
Dept: PHYSICAL THERAPY | Age: 62
Discharge: HOME | End: 2017-12-12
Payer: COMMERCIAL

## 2017-12-12 PROCEDURE — 97140 MANUAL THERAPY 1/> REGIONS: CPT

## 2017-12-12 PROCEDURE — 97110 THERAPEUTIC EXERCISES: CPT

## 2017-12-12 NOTE — PROGRESS NOTES
Bailee Herbert  : 1955  Primary: [facility]  Secondary:  [facility]rapy Center at [address] [place] [address]  [phone]   [phone]       OUTPATIENT PHYSICAL THERAPY:Daily Note 2017    ICD-10: Treatment Diagnosis: Cervicalgia (M54.2), Pain in Left Shoulder (M25.512)  Precautions/Allergies: None noted  Fall Risk Score: 0 (? 5 = High Risk)  MD Orders: Evaluate and treat MEDICAL/REFERRING DIAGNOSIS:Neck pain [M54.2]  Shoulder pain [M25.519]  DATE OF ONSET: 3 weeks ago  REFERRING PHYSICIAN:Referred, Self, MD  RETURN PHYSICIAN APPOINTMENT: 17     INITIAL ASSESSMENT:  Mrs. Tami Feldman presents to physical therapy with increased pain in her neck and shoulder, as well as headaches, limited ROM and decreased strength in her upper extremities. The examination reveals a lack of mobility in the joints of her upper, middle and lower cervical spine, as well as her upper and middle thoracic spine, tightness of the soft tissue in her cervical and thoracic region, slight weakness in the upper extremities. The examination is of low complexity due to the lack of other contributing factors. Skilled physical therapy is recommended to progress the plan of care in order for the patient to return to full function with minimal symptoms. PROBLEM LIST (Impacting functional limitations):  1. Decreased Strength  2. Decreased ADL/Functional Activities  3. Increased Pain  4. Decreased Activity Tolerance  5. Decreased Flexibility/Joint Mobility  6. Decreased Cameron with Home Exercise Program INTERVENTIONS PLANNED:  1. Cold  2. Heat  3. Home Exercise Program (HEP)  4. Manual Therapy  5. Range of Motion (ROM)  6. Therapeutic Exercise/Strengthening     TREATMENT PLAN:  Effective Dates: 17 TO 18. Frequency/Duration: 2 times a week for 4 weeks  GOALS: (Goals have been discussed and agreed upon with patient.)  SHORT-TERM FUNCTIONAL GOALS: Time Frame: 2-4 wks  1. Patient will report 25-50% reduction in overall symptoms  2. Patient will be compliant with HEP and plan of care  3. Patient will have left cervical rotation ROM equal to the right  4. Patient will improve on the NDIby 3-5 points  DISCHARGE GOALS: Time Frame: 6-8 wk  1. Patient will report % reduction in overall symptoms in order to be able to have full function with decreased symptoms  2. Patient will hike for > 1hr with no pain in the shoulder or neck  3. Patient will haven no headaches during a 3-4 week period  4. Patient will improve on the NDI by 8-10 points in order to demonstrate improved function with less pain    Rehabilitation Potential For Stated Goals: Good            HISTORY:   History of Present Injury/Illness (Reason for Referral):  Mrs. Christie Gallo comes to therapy following a 10-year history of on and off neck pain. She complains that the pain is throughout her neck and into her upper trapezius, as well as along her medial scapula border and in her thoracic spine. She also states that starting 3 weeks ago, she had an insidious onset of left shoulder pain. The pain is limiting her from exercises, specifically lifting weights and working on the upper extremities. The pain in her neck causes headaches that can last 1-3 days that she has at least once every 2-4 weeks. She has limited ROM of the joints in her upper, middle and lower cervical spine as well as in her upper and middle thoracic spine. She has slight weaknesses in her left upper extremities, mostly due to pain.   Past Medical History/Comorbidities: Patellar Fx  Social History/Living Environment: Lives with spouse, retired  Prior Level of Function/Work/Activity: Exercises regularly (hiking, gym)  Current Medications:   None  Date Last Reviewed:  12/12/2017   EXAMINATION:   (Abbreviations: *-pain, NP- no pain, wnl-within normal limits)  Observation/Orthostatic Postural Assessment:    Standing resting posture:  · Rounded shoulders, forward head, increased upper thoracic kyphosis, scapular winging, L scapular elevation, right side bend at CT junction, right convexity in the thoracic spine from CT junction-T8    Sitting resting posture:  · Rounded shoulders, forward head, increased upper thoracic kyphosis, scapular winging, L scapular elevation, right side bend at CT junction, right convexity in the thoracic spine from CT junction-T8    Palpation/tone/tissue texture:    Soft tissue:  · Upper traps: increased tone bilaterally up to occipit  · Levatore scapulae: increased tone  · Suboccipitals: Increased tone  · Thoracic Paraspinals: increased tone on left    PAIVM: (passive accessory intervertebral motion)  · C0-C1: restricted in L rotation  · C1-C2: restricted on left  · Mid Cervical: restricted on left  · Lower Cervical: restricted on left  · Upper thoracic: restricted on left  · Mid thoracic: restricted on left      ROM:  (P-pain  NP-no pain)  Cervical ROM  (tested in sitting) Date:  11-28-17       Flexion -   Extension Restricted   Rotation L Restricted   Rotation R WNL   Quadrant exten Pain on L   Quadrant flex WNL     Shoulder ROM Date:  11-28-17       Right Left   Flexion WNL WNL*   Abduction WNL WNL*   IR WNL WNL   ER WNL WNL    Thoracic ROM  (tested in sitting) Date:  11-28-17       Flexion WNL   Extension Limited   Rotation L Limited   Rotation R WNL           Strength:   Shoulder strength Date:  11-28-17       Right Left   Flexion 5 5   Abduction 5 5   IR 5 5   ER 5 5 @ neutral  4* @ 90 deg        Scapular stability Date:  11-28-17       Right Left   Mid trap WNL Weakness, substitution of upper trap   Low trap WNL Weakness   Rhomboids WNL Weakness     Cervical Strength Date:  11-28-17   Chin tuck: Able to perform, good firing             Special Tests:   Alar ligament stress test: (-) bilateral  Neurological Screen:   Myotomes: WNL         Dermatomes: WNL         Reflexes:  All normal         Neural Tension Tests: ULTT (median): Functional Mobility:    Patient has increased scapular winging with overhead motions     CLINICAL DECISION MAKING:   Outcome Measure: Tool Used: Neck Disability Index (NDI)  Score:  Initial: 14/50  Most Recent: X/50 (Date: -- )   Interpretation of Score: The Neck Disability Index is a revised form of the Oswestry Low Back Pain Index and is designed to measure the activities of daily living in person's with neck pain. Each section is scored on a 0-5 scale, 5 representing the greatest disability. The scores of each section are added together for a total score of 50. Score 0 1-10 11-20 21-30 31-40 41-49 50   Modifier CH CI CJ CK CL CM CN     Tool Used: Disabilities of the Arm, Shoulder and Hand (DASH) Questionnaire - Quick Version  Score:  Initial: 28/55  Most Recent: X/55 (Date: -- )   Interpretation of Score: The DASH is designed to measure the activities of daily living in person's with upper extremity dysfunction or pain. Each section is scored on a 1-5 scale, 5 representing the greatest disability. The scores of each section are added together for a total score of 55. Score 11 12-19 20-28 29-37 38-45 46-54 55   Modifier CH CI CJ CK CL CM CN     Medical Necessity:   · Patient is expected to demonstrate progress in strength, range of motion and symptom levels to return to full function. Reason for Services/Other Comments:  · Patient continues to require skilled intervention due to ongoing symptoms. TREATMENT:   (In addition to Assessment/Re-Assessment sessions the following treatments were rendered)    Subjective Pre-Treatment Symptoms: Patient reports that she is doing pretty good. States she had one headache on Friday after sitting around for a good portion of the day. Therapeutic Exercise: (30 min) Done in order to improve strength, ROM and understanding of current condition.     Date  12-5-17 Date  12-7-17 Date  12-12-17   Activity/Exercise      Education · Discussed HEP  · Discussed her sleeping posture in detail  · Discussed HEP · Discussed HEP   Self thoracic extension  Discussed and demonstrated form Discussed HEP   Diaphragmatic breathing discussed Discussed HEP   Rib cage expansion In R side-lying, raising left arm overhead while breathing at 1:2 ratio and also with forward reaching In R & L side-lying, raising left arm overhead while breathing at 1:2 ratio and also with forward reaching HEP   W's discussed Discussed HEP   Scapular Muscle Control  Sidelying: arm to 90 forward elevation, retraction and eccentric control, 2x10 each side · Side-lying, low trap engagement and lift off, manually facilitated and resisted, 10 sec hold, 10x, on L  · Quadruped weigh shift while in thoracic flex to activate SA better, contralateral arm flexion, 10x   Rotator cuff stabilization  IR at 90/90: eccentric control, 10-20 se, 10x Supine: arm ABD to 70 deg, eccentric control, 10x, both arms  Sitting: arm ABD to 70 deg, elbow on pillow, IR w/ yellow TB, eccentric control, 10x Supine, arm at 80 deg abd: eccentric control or IR muscle group, 10x, 2 sets   Thoracic rotation Bilateral, hips maximally flexed, 10x Bilateral, hips maximally flexed, 10x Bilateral, hips maximally flexed, 10x, end range active stabilization into rotation     Manual Therapy: (30 min) Done in order to improve joint and soft tissue mobility,reduce muscle guarding, and decrease muscle tone  · Soft tissue mobilization along the left rib cage and paraspinals   · PA at L TP's and rotational mobilization at [facility] in the mid thoracic spine   · Upper, mid and lower thoracic mobilization into extension, grade III-IV  · Rib expansion on L side, done with breathing (NOT DONE TODAY)    Modalities: (-) Done in order to reduce swelling and pain    Treatment/Session Assessment:  Patient tolerated the session well. Her thoracic mobility improved after the manual treatment.  Her RC and scapular stability is improving on the left side but still lacks endurance  · Pain: Initial:    Minimal symptoms at rest Post Session:  Minimal symptoms at rest ·   Compliance with Program/Exercises: Will assess as treatment progresses. · Recommendations/Intent for next treatment session: \"Next visit will focus on progressing the patients plan of care\".   Future Appointments  Date Time Provider Poonam Zapata   12/19/2017 10:30 AM Blanca Kim, PT, DPT [facility]     Total Treatment Duration: 60 min  PT Patient Time In/Time Out  Time In: 0830  Time Out: 0930    Nithin Carlisle PT, DPT

## 2017-12-19 ENCOUNTER — HOSPITAL ENCOUNTER (OUTPATIENT)
Dept: PHYSICAL THERAPY | Age: 62
Discharge: HOME | End: 2017-12-19
Payer: COMMERCIAL

## 2017-12-19 PROCEDURE — 97140 MANUAL THERAPY 1/> REGIONS: CPT

## 2017-12-19 PROCEDURE — 97110 THERAPEUTIC EXERCISES: CPT

## 2017-12-19 PROCEDURE — 97164 PT RE-EVAL EST PLAN CARE: CPT

## 2017-12-19 NOTE — PROGRESS NOTES
Norma Olivas  : 1955  Primary: [facility]  Secondary:  [facility] Center at [facility]  [address] [place] [address]  [phone]   [phone]       OUTPATIENT PHYSICAL THERAPY:Daily Note and Discharge 2017    ICD-10: Treatment Diagnosis: Cervicalgia (M54.2), Pain in Left Shoulder (M25.512)  Precautions/Allergies: None noted  Fall Risk Score: 0 (? 5 = High Risk)  MD Orders: Evaluate and treat MEDICAL/REFERRING DIAGNOSIS:Neck pain [M54.2]  Shoulder pain [M25.519]  DATE OF ONSET: 3 weeks ago  REFERRING PHYSICIAN:Referred, Self, MD  RETURN PHYSICIAN APPOINTMENT: 17    ASSESSMENT:  Mrs. Ed Perales has come for a total of 6 visits since starting physical therapy on 17. During that time she made some improvements but recently went on a trip for several days to [place] and suffered a set back. She is currently traveling to [place] for the next 2.5 months so we will discharge this plan of care. She was advised to follow up with someone their. Thank you. GOALS: (Goals have been discussed and agreed upon with patient.)  SHORT-TERM FUNCTIONAL GOALS: Time Frame: 2-4 wks  1. Patient will report 25-50% reduction in overall symptoms (MET but not consistent)  2. Patient will be compliant with HEP and plan of care  (MET)  3. Patient will have left cervical rotation ROM equal to the right (NOT MET)  4. Patient will improve on the NDIby 3-5 points (MET)  DISCHARGE GOALS: Time Frame: 6-8 wk  1. Patient will report % reduction in overall symptoms in order to be able to have full function with decreased symptoms (NOT MET)  2. Patient will hike for > 1hr with no pain in the shoulder or neck (NOT ATTEMPTED)   3. Patient will haven no headaches during a 3-4 week period (NOT MET)  4.   Patient will improve on the NDI by 8-10 points in order to demonstrate improved function with less pain (NOT MET)              HISTORY:   History of Present Injury/Illness (Reason for Referral):  Mrs. Saurav Luque comes to therapy following a 10-year history of on and off neck pain. She complains that the pain is throughout her neck and into her upper trapezius, as well as along her medial scapula border and in her thoracic spine. She also states that starting 3 weeks ago, she had an insidious onset of left shoulder pain. The pain is limiting her from exercises, specifically lifting weights and working on the upper extremities. The pain in her neck causes headaches that can last 1-3 days that she has at least once every 2-4 weeks. She has limited ROM of the joints in her upper, middle and lower cervical spine as well as in her upper and middle thoracic spine. She has slight weaknesses in her left upper extremities, mostly due to pain.   Past Medical History/Comorbidities: Patellar Fx  Social History/Living Environment: Lives with spouse, retired  Prior Level of Function/Work/Activity: Exercises regularly (hiking, gym)  Current Medications:   None  Date Last Reviewed:  12/20/2017   EXAMINATION:   (Abbreviations: *-pain, NP- no pain, wnl-within normal limits)  Observation/Orthostatic Postural Assessment:    Standing resting posture:  · Rounded shoulders, forward head, increased upper thoracic kyphosis, scapular winging, L scapular elevation, right side bend at CT junction, right convexity in the thoracic spine from CT junction-T8    Sitting resting posture:  · Rounded shoulders, forward head, increased upper thoracic kyphosis, scapular winging, L scapular elevation, right side bend at CT junction, right convexity in the thoracic spine from CT junction-T8    Palpation/tone/tissue texture:    Soft tissue:  · Upper traps: increased tone bilaterally up to occipit  · Levatore scapulae: increased tone  · Suboccipitals: Increased tone  · Thoracic Paraspinals: increased tone on left    PAIVM: (passive accessory intervertebral motion)  · C0-C1: restricted in L rotation (improved)  · C1-C2: restricted on left (improved)  · Mid Cervical: restricted on left  · Lower Cervical: restricted on left  · Upper thoracic: restricted on left (improved)  · Mid thoracic: restricted on left (improved)      ROM:  (P-pain  NP-no pain)  Cervical ROM  (tested in sitting) Date:  12-19-17       Flexion wnl   Extension Restricted   Rotation L 80   Rotation R 85 deg   Quadrant exten Pain on L   Quadrant flex WNL     Shoulder ROM Date:  12-19-17       Right Left   Flexion WNL WNL*   Abduction WNL WNL*   IR WNL WNL   ER WNL WNL    Thoracic ROM  (tested in sitting) Date:  12-19-17       Flexion WNL   Extension Limited   Rotation L Mild limitation   Rotation R WNL           Strength:   Shoulder strength Date:  12-19-17       Right Left   Flexion 5 5   Abduction 5 5   IR 5 5   ER 5 5 @ neutral  4 @ 90 deg        Scapular stability Date:  12-19-17       Right Left   Mid trap WNL Weakness, substitution of upper trap (slight improvement noted)   Low trap WNL Weakness (slight improvement noted)   Rhomboids WNL Weakness     Cervical Strength Date:  12-19-17   Chin tuck: Able to perform, good firing             Special Tests:   Alar ligament stress test: (-) bilateral  Neurological Screen:   Myotomes: WNL         Dermatomes: WNL         Reflexes: All normal         Neural Tension Tests: ULTT (median):   Functional Mobility:    Patient has increased scapular winging with overhead motions     CLINICAL DECISION MAKING:   Outcome Measure: Tool Used: Neck Disability Index (NDI)  Score:  Initial: 14/50  Most Recent: 11/50 (Date: 12-19-17 )   Interpretation of Score: The Neck Disability Index is a revised form of the Oswestry Low Back Pain Index and is designed to measure the activities of daily living in person's with neck pain. Each section is scored on a 0-5 scale, 5 representing the greatest disability. The scores of each section are added together for a total score of 50.    Score 0 1-10 11-20 21-30 31-40 41-49 50   Modifier CH CI CJ CK CL CM CN Tool Used: Disabilities of the Arm, Shoulder and Hand (DASH) Questionnaire - Quick Version  Score:  Initial: 28/55  Most Recent: X/55 (Date: -- )   Interpretation of Score: The DASH is designed to measure the activities of daily living in person's with upper extremity dysfunction or pain. Each section is scored on a 1-5 scale, 5 representing the greatest disability. The scores of each section are added together for a total score of 55. Score 11 12-19 20-28 29-37 38-45 46-54 55   Modifier CH CI CJ CK CL CM CN     Medical Necessity:   · Patient is expected to demonstrate progress in strength, range of motion and symptom levels to return to full function. Reason for Services/Other Comments:  · Patient continues to require skilled intervention due to ongoing symptoms. TREATMENT:   (In addition to Assessment/Re-Assessment sessions the following treatments were rendered)    Subjective Pre-Treatment Symptoms: Patient reports that she started having increased shoulder pain when she was in [place]. She isnt sure what caused it. Therapeutic Exercise: (15 min) Done in order to improve strength, ROM and understanding of current condition.     Date  12-7-17 Date  12-12-17 Date  12-19-17   Activity/Exercise      Education · Discussed HEP · Discussed HEP · Discussed HEP   Taping   Taped shoulder for stability, used 2 I strips of kinesio tape   Self thoracic extension  Discussed HEP HEP   Diaphragmatic breathing Discussed HEP HEP   Rib cage expansion In R & L side-lying, raising left arm overhead while breathing at 1:2 ratio and also with forward reaching HEP Bilateral, manually guided    W's Discussed HEP -   Scapular Muscle Control Sidelying: arm to 90 forward elevation, retraction and eccentric control, 2x10 each side · Side-lying, low trap engagement and lift off, manually facilitated and resisted, 10 sec hold, 10x, on L  · Quadruped weigh shift while in thoracic flex to activate SA better, contralateral arm flexion, 10x · Prone scapular retraction with cervical retraction, 10 sec hold, 5x   Rotator cuff stabilization  Supine: arm ABD to 70 deg, eccentric control, 10x, both arms  Sitting: arm ABD to 70 deg, elbow on pillow, IR w/ yellow TB, eccentric control, 10x Supine, arm at 80 deg abd: eccentric control or IR muscle group, 10x, 2 sets -   Thoracic rotation Bilateral, hips maximally flexed, 10x Bilateral, hips maximally flexed, 10x, end range active stabilization into rotation Bilateral, 3x     Manual Therapy: (30 min) Done in order to improve joint and soft tissue mobility,reduce muscle guarding, and decrease muscle tone  · Soft tissue mobilization along the left rib cage and paraspinals   · Instrument assisted soft tissue mobilization to left infraspinatus, informed consent signed on 12-19-17 followed by manual soft tissue release  · PA at L TP's and rotational mobilization at [facility] in the mid thoracic spine   · Upper, mid and lower thoracic mobilization into extension, grade III-IV  · Rib expansion on L side, done with breathing (NOT DONE TODAY)    Modalities: (-) Done in order to reduce swelling and pain    Treatment/Session Assessment:  Patient tolerated the session well. We discussed her home program in length and was educated to contact me as needed. She was advised to follow up with a physical therapist in [place] for the time that she is there.   · Pain: Initial:    4/10 in her left shoulder Post Session:  Just soreness reported      Total Treatment Duration: 60 min  PT Patient Time In/Time Out  Time In: 1030  Time Out: [address] PT, DPT

## 2018-03-13 ENCOUNTER — HOSPITAL ENCOUNTER (OUTPATIENT)
Dept: PHYSICAL THERAPY | Age: 63
End: 2018-03-13
Payer: COMMERCIAL

## 2018-03-15 ENCOUNTER — HOSPITAL ENCOUNTER (OUTPATIENT)
Dept: PHYSICAL THERAPY | Age: 63
Discharge: HOME | End: 2018-03-15
Payer: COMMERCIAL

## 2018-03-15 PROCEDURE — 97161 PT EVAL LOW COMPLEX 20 MIN: CPT

## 2018-03-15 PROCEDURE — 97110 THERAPEUTIC EXERCISES: CPT

## 2018-03-15 NOTE — THERAPY EVALUATION
Ade Posadas  : 1955  Primary: Bessie Tirado Ppo  Secondary:  Therapy Center at [address] [place] [address]  [phone]   [phone]         OUTPATIENT PHYSICAL THERAPY:Initial Assessment 3/15/2018    ICD-10: Treatment Diagnosis: PAIN IN THORACIC SPINE M54.6; CERVICALGIA M54.2; NEUROMUSCULAR SCOLIOSIS, CERVICOTHORACIC REGION M41.43;  M75.02    Precautions/Allergies: non reported  Fall Risk Score: 0 (? 5 = High Risk)  MEDICAL/REFERRING DIAGNOSIS:Thoracic back pain [M54.6]  DATE OF ONSET: chronic   REFERRING PHYSICIAN:Referred, Yobani, MD     INITIAL ASSESSMENT:  Ms. Lonza Fothergill presents to physical therapy with symptoms of left shoulder pain and mid back/neck pain . The examination reveals moderate IR limitations, scoliotic curvature causing limitations in rotation and sidebending ROM and altered breathing pattern. The examination is of low complexity due to a stable clinical presentation. Skilled physical therapy is recommended to progress the plan of care in order for the patient to return to full function with minimal symptoms. PROBLEM LIST (Impacting functional limitations):  1. Decreased Strength  2. Decreased ADL/Functional Activities  3. Increased Pain  4. Decreased Activity Tolerance  5. Decreased Flexibility/Joint Mobility  6. Decreased [place] with Home Exercise Program INTERVENTIONS PLANNED:  1. Cold  2. Heat  3. Home Exercise Program (HEP)  4. Manual Therapy  5. Range of Motion (ROM)  6. Therapeutic Exercise/Strengthening     TREATMENT PLAN:  Effective Dates: 3-15-18 TO 5-15-18. Frequency/Duration: 2-4 times a month for 2 months   GOALS: (Goals have been discussed and agreed upon with patient.)  SHORT-TERM FUNCTIONAL GOALS: Time Frame: 2-4 wks  1. Patient will report 25-50% reduction in overall symptoms  2. Patient will be compliant with HEP and plan of care  3. Patient will improve IR by 10 deg  4.   Patient will improve on the Oswestry by 3-5 points  DISCHARGE GOALS: Time Frame: 6-8 wk  1. Patient will report % reduction in overall symptoms in order to be able to have full function with decreased symptoms  2. Patient will be able to hike 3-5 miles with minimal increase in symptoms   3. Patient will be able to self manage her condition in order to continue living an active life style  4. Patient will improve on the Oswestry  by 8-10 points in order to demonstrate improved function with less pain    Rehabilitation Potential For Stated Goals: Good    Regarding [place] therapy, I certify that the treatment plan above will be carried out by a therapist or under their direction. Marycarmen Mendez PT,DPT              HISTORY:   History of Present Injury/Illness (Reason for Referral): Reports that she developed frozen shoulder on the left side for which she went to therapy while she was in [place]. She is feeling some better from the shoulder. Her neck and mid back have been okay but she has not been as active. Past Medical History/Comorbidities:   Past Medical History:   Diagnosis Date    Osteoarthritis of hand     Osteopenia      Social History/Living Environment: lives with  in a single family home  Prior Level of Function/Work/Activity:independent, retired   Current Medications:     Current Outpatient Prescriptions:     ESTRACE 0.01 % (0.1 mg/gram) vaginal cream, , Disp: , Rfl:     CALCIUM CARBONATE (CALCIUM 300 PO), Take 1 Tab by mouth daily. , Disp: , Rfl:     calcium 500 mg Tab, Take 1 Tab by mouth daily. , Disp: , Rfl:     Cholecalciferol, Vitamin D3, (VITAMIN D3) 1,000 unit cap, Take  by mouth., Disp: , Rfl:     ASCORBATE CALCIUM (VITAMIN C PO), Take 1 Tab by mouth daily. , Disp: , Rfl:     FLAXSEED OIL (OMEGA 3 PO), Take 1 Tab by mouth daily. , Disp: , Rfl:     MAGNESIUM PO, Take 1 Tab by mouth daily. , Disp: , Rfl:     SELENIUM PO, Take  by mouth., Disp: , Rfl:     OTHER, Vit k2 liquid once daily, Disp: , Rfl:   Date Last Reviewed:  3/15/2018   Number of Personal Factors/Comorbidities that affect the Plan of Care: 0: LOW COMPLEXITY   EXAMINATION:   (Abbreviations: P-pain, NP- no pain, wnl-within normal limits)  Observation/Orthostatic Postural Assessment:    Standing resting posture:  · Scoliotic curvature noted, convex L in thoracic, convex R TL junction region  · Rotated L in mid thoracic   · Flexed at T1-T2    Palpation/tone/tissue texture:    Soft tissue:  · Upper traps: mild tightness bilateral   · Levatore scapulae: mild tightness bilateral  · Sternocleidomastoid: n/a   · Scalenes:n/a  · Suboccipitals:n/a     PAIVM: (passive accessory intervertebral motion)  · C0-C1:n/a  · C1-C2:n/a  · Mid Cervical: wnl  · Lower Cervical: relatively wnl  · Upper thoracic: hypomobile  · Mid thoracic:hypomobile on L side    Breathing Assessment:  · Upper chest (pump handle): present with deeper breathing  · Mid thoracic (bucket handle): dominant   · Lower rib cage (caliper): minimal A-P motion        ROM:  (P-pain  NP-no pain)  Cervical ROM  (tested in sitting) Date:  3-15-18       Flexion wnl   Extension wnl   Rotation L 75 deg   Rotation R 80 deg   Quadrant exten n/a   Quadrant flex n/a     Shoulder ROM Date:  3-15-18       Right Left   Flexion wnl End range limitation    Abduction wnl End range limitation   IR T4 T11  (20 deg)   ER T4 T2    Thoracic ROM  (tested in sitting) Date:  3-15-18       Flexion wnl   Extension Mild limitation in mid thoracic   Rotation L 25%   Rotation R 25%   SB R 50%, C curve noted   SB L 50%, C curve noted           Strength:   Shoulder strength Date:  3-15-18       Right Left   Flexion 5 5   Abduction 5 5   IR 5 5   ER 5 5        Scapular stability Date:  3-15-18       Right Left   Mid trap 4+ 4+   Low trap 4- 3+   Rhomboids 4 4     Cervical Strength Date:  3-15-18   Chin tuck: n/a             Special Tests: -  Neurological Screen:   Myotomes: strong in bilateral UE's         Dermatomes: intact in bilateral UE's         Reflexes: n/a         Neural Tension Tests: ULTT (median):   Functional Mobility:    Difficulty with behind the back motions of the arm  Balance:-   Body Structures Involved:  1. Joints  2. Muscles  3. Ligaments Body Functions Affected:  1. Sensory/Pain  2. Neuromusculoskeletal  3. Movement Related Activities and Participation Affected:  1. General Tasks and Demands  2. Mobility  3. Self Care  4. Domestic Life  5. Interpersonal Interactions and Relationships  6. Community, Social and [place]   Number of elements that affect the Plan of Care: 4+: HIGH COMPLEXITY   CLINICAL PRESENTATION:   Presentation: Stable and uncomplicated: LOW COMPLEXITY   CLINICAL DECISION MAKING:   Outcome Measure: Tool Used: Modified Oswestry Low Back Pain Questionnaire  Score:  Initial: 10/50  Most Recent: X/50 (Date: -- )   Interpretation of Score: Each section is scored on a 0-5 scale, 5 representing the greatest disability. The scores of each section are added together for a total score of 50. Score 0 1-10 11-20 21-30 31-40 41-49 50   Modifier CH CI CJ CK CL CM CN       Medical Necessity:   · Patient is expected to demonstrate progress in strength, range of motion and symptom levels to return to full function. Reason for Services/Other Comments:  · Patient continues to require skilled intervention due to ongoing symptoms. Use of outcome tool(s) and clinical judgement create a POC that gives a: Clear prediction of patient's progress: LOW COMPLEXITY   TREATMENT:   (In addition to Assessment/Re-Assessment sessions the following treatments were rendered)    Subjective Pre-Treatment Symptoms: reports that her shoulder still has limitations in motion. Neck and mid back are doing okay at this point. Therapeutic Exercise: (15 min) Done in order to improve strength, ROM and understanding of current condition.     Date:  3-15-18   Activity/Exercise Parameters   Education Discussed examination findings, HEP, plan of care Sleeper stretch & cross arm stretch Discussed and demonstrated    Thoracic rotation  Side-lying, 10x, bilateral   Diaphragmatic breathing  In supine, progressing to sitting   Manual Therapy: (-) Done in order to improve joint and soft tissue mobility,reduce muscle guarding, and decrease muscle tone    Modalities: (-) Done in order to reduce swelling and pain    Treatment/Session Assessment:  Patient tolerated the session well. Her HEP and plan of care were discussed. · Pain: Initial:    Minor soreness reported Post Session:  Minor soreness ·   Compliance with Program/Exercises: Will assess as treatment progresses. · Recommendations/Intent for next treatment session: \"Next visit will focus on progressing the patients plan of care\".     Future Appointments  Date Time Provider Poonam Zapata   3/27/2018 10:30 AM Fabrice Govea, PT, DPT [facility]   4/10/2018 10:30 AM Fabrice Govea PT, DPT [facility]     Total Treatment Duration: evaluation 55 min, treatment 15 min  PT Patient Time In/Time Out  Time In: 6760  Time Out: 88 [facility] PT, DPT

## 2018-03-15 NOTE — PROGRESS NOTES
Ambulatory/Rehab Services H2 Model Falls Risk Assessment    Risk Factor Pts. ·   Confusion/Disorientation/Impulsivity  []    4 ·   Symptomatic Depression  []   2 ·   Altered Elimination  []   1 ·   Dizziness/Vertigo  []   1 ·   Gender (Male)  []   1 ·   Any administered antiepileptics (anticonvulsants):  []   2 ·   Any administered benzodiazepines:  []   1 ·   Visual Impairment (specify):  []   1 ·   Portable Oxygen Use  []   1 ·   Orthostatic ? BP  []   1 ·   History of Recent Falls (within 3 mos.)  []   5     Ability to Rise from Chair (choose one) Pts. ·   Ability to rise in a single movement  []   0 ·   Pushes up, successful in one attempt  []   1 ·   Multiple attempts, but successful  []   3 ·   Unable to rise without assistance  []   4   Total: (5 or greater = High Risk) 0     Falls Prevention Plan:   []                Physical Limitations to Exercise (specify):   []                Mobility Assistance Device (type):   []                Exercise/Equipment Adaptation (specify):    ©2010 [facility] [address] [place] Patent #[id number].  Federal Law prohibits the replication, distribution or use without written permission from [facility] [employer]

## 2018-03-20 ENCOUNTER — APPOINTMENT (OUTPATIENT)
Dept: PHYSICAL THERAPY | Age: 63
End: 2018-03-20
Payer: COMMERCIAL

## 2018-03-27 ENCOUNTER — HOSPITAL ENCOUNTER (OUTPATIENT)
Dept: PHYSICAL THERAPY | Age: 63
Discharge: HOME | End: 2018-03-27
Payer: COMMERCIAL

## 2018-03-27 PROCEDURE — 97110 THERAPEUTIC EXERCISES: CPT

## 2018-03-27 PROCEDURE — 97140 MANUAL THERAPY 1/> REGIONS: CPT

## 2018-03-27 NOTE — PROGRESS NOTES
Sarahi Rodas  : 1955  Primary: Jesenia White Ppo  Secondary:  Therapy Center at [address] [place] [address]  [phone]   [phone]         OUTPATIENT PHYSICAL THERAPY:Daily Note 3/27/2018    ICD-10: Treatment Diagnosis: PAIN IN THORACIC SPINE M54.6; CERVICALGIA M54.2; NEUROMUSCULAR SCOLIOSIS, CERVICOTHORACIC REGION M41.43;  M75.02    Precautions/Allergies: non reported  Fall Risk Score: 0 (? 5 = High Risk)  MEDICAL/REFERRING DIAGNOSIS:Thoracic back pain [M54.6]  DATE OF ONSET: chronic   REFERRING PHYSICIAN:Referred, Self, MD     INITIAL ASSESSMENT:  Ms. Francis Mckeon presents to physical therapy with symptoms of left shoulder pain and mid back/neck pain . The examination reveals moderate IR limitations, scoliotic curvature causing limitations in rotation and sidebending ROM and altered breathing pattern. The examination is of low complexity due to a stable clinical presentation. Skilled physical therapy is recommended to progress the plan of care in order for the patient to return to full function with minimal symptoms. PROBLEM LIST (Impacting functional limitations):  1. Decreased Strength  2. Decreased ADL/Functional Activities  3. Increased Pain  4. Decreased Activity Tolerance  5. Decreased Flexibility/Joint Mobility  6. Decreased [place] with Home Exercise Program INTERVENTIONS PLANNED:  1. Cold  2. Heat  3. Home Exercise Program (HEP)  4. Manual Therapy  5. Range of Motion (ROM)  6. Therapeutic Exercise/Strengthening     TREATMENT PLAN:  Effective Dates: 3-15-18 TO 5-15-18. Frequency/Duration: 2-4 times a month for 2 months   GOALS: (Goals have been discussed and agreed upon with patient.)  SHORT-TERM FUNCTIONAL GOALS: Time Frame: 2-4 wks  1. Patient will report 25-50% reduction in overall symptoms  2. Patient will be compliant with HEP and plan of care  3. Patient will improve IR by 10 deg  4.   Patient will improve on the Oswestry by 3-5 points  DISCHARGE GOALS: Time Frame: 6-8 wk  1. Patient will report % reduction in overall symptoms in order to be able to have full function with decreased symptoms  2. Patient will be able to hike 3-5 miles with minimal increase in symptoms   3. Patient will be able to self manage her condition in order to continue living an active life style  4. Patient will improve on the Oswestry  by 8-10 points in order to demonstrate improved function with less pain    Rehabilitation Potential For Stated Goals: Good    Regarding [place] [place] therapy, I certify that the treatment plan above will be carried out by a therapist or under their direction. Zach Jacobson PT,DPT              HISTORY:   History of Present Injury/Illness (Reason for Referral): Reports that she developed frozen shoulder on the left side for which she went to therapy while she was in [place]. She is feeling some better from the shoulder. Her neck and mid back have been okay but she has not been as active. Past Medical History/Comorbidities:   Past Medical History:   Diagnosis Date    Osteoarthritis of hand     Osteopenia      Social History/Living Environment: lives with  in a single family home  Prior Level of Function/Work/Activity:independent, retired   Current Medications:     Current Outpatient Prescriptions:     ESTRACE 0.01 % (0.1 mg/gram) vaginal cream, , Disp: , Rfl:     CALCIUM CARBONATE (CALCIUM 300 PO), Take 1 Tab by mouth daily. , Disp: , Rfl:     calcium 500 mg Tab, Take 1 Tab by mouth daily. , Disp: , Rfl:     Cholecalciferol, Vitamin D3, (VITAMIN D3) 1,000 unit cap, Take  by mouth., Disp: , Rfl:     ASCORBATE CALCIUM (VITAMIN C PO), Take 1 Tab by mouth daily. , Disp: , Rfl:     FLAXSEED OIL (OMEGA 3 PO), Take 1 Tab by mouth daily. , Disp: , Rfl:     MAGNESIUM PO, Take 1 Tab by mouth daily. , Disp: , Rfl:     SELENIUM PO, Take  by mouth., Disp: , Rfl:     OTHER, Vit k2 liquid once daily, Disp: , Rfl:   Date Last Reviewed:  3/27/2018   Number of Personal Factors/Comorbidities that affect the Plan of Care: 0: LOW COMPLEXITY   EXAMINATION:   (Abbreviations: P-pain, NP- no pain, wnl-within normal limits)  Observation/Orthostatic Postural Assessment:    Standing resting posture:  · Scoliotic curvature noted, convex L in thoracic, convex R TL junction region  · Rotated L in mid thoracic   · Flexed at T1-T2    Palpation/tone/tissue texture:    Soft tissue:  · Upper traps: mild tightness bilateral   · Levatore scapulae: mild tightness bilateral  · Sternocleidomastoid: n/a   · Scalenes:n/a  · Suboccipitals:n/a     PAIVM: (passive accessory intervertebral motion)  · C0-C1:n/a  · C1-C2:n/a  · Mid Cervical: wnl  · Lower Cervical: relatively wnl  · Upper thoracic: hypomobile  · Mid thoracic:hypomobile on L side    Breathing Assessment:  · Upper chest (pump handle): present with deeper breathing  · Mid thoracic (bucket handle): dominant   · Lower rib cage (caliper): minimal A-P motion        ROM:  (P-pain  NP-no pain)  Cervical ROM  (tested in sitting) Date:  3-15-18       Flexion wnl   Extension wnl   Rotation L 75 deg   Rotation R 80 deg   Quadrant exten n/a   Quadrant flex n/a     Shoulder ROM Date:  3-15-18       Right Left   Flexion wnl End range limitation    Abduction wnl End range limitation   IR T4 T11  (20 deg)   ER T4 T2    Thoracic ROM  (tested in sitting) Date:  3-15-18       Flexion wnl   Extension Mild limitation in mid thoracic   Rotation L 25%   Rotation R 25%   SB R 50%, C curve noted   SB L 50%, C curve noted           Strength:   Shoulder strength Date:  3-15-18       Right Left   Flexion 5 5   Abduction 5 5   IR 5 5   ER 5 5        Scapular stability Date:  3-15-18       Right Left   Mid trap 4+ 4+   Low trap 4- 3+   Rhomboids 4 4     Cervical Strength Date:  3-15-18   Chin tuck: n/a             Special Tests: -  Neurological Screen:   Myotomes: strong in bilateral UE's         Dermatomes: intact in bilateral UE's Reflexes: n/a         Neural Tension Tests: ULTT (median):   Functional Mobility:    Difficulty with behind the back motions of the arm  Balance:-   CLINICAL DECISION MAKING:   Outcome Measure: Tool Used: Modified Oswestry Low Back Pain Questionnaire  Score:  Initial: 10/50  Most Recent: X/50 (Date: -- )   Interpretation of Score: Each section is scored on a 0-5 scale, 5 representing the greatest disability. The scores of each section are added together for a total score of 50. Score 0 1-10 11-20 21-30 31-40 41-49 50   Modifier CH CI CJ CK CL CM CN       Medical Necessity:   · Patient is expected to demonstrate progress in strength, range of motion and symptom levels to return to full function. Reason for Services/Other Comments:  · Patient continues to require skilled intervention due to ongoing symptoms. Use of outcome tool(s) and clinical judgement create a POC that gives a: Clear prediction of patient's progress: LOW COMPLEXITY   TREATMENT:   (In addition to Assessment/Re-Assessment sessions the following treatments were rendered)    Subjective Pre-Treatment Symptoms: reports that she went hiking several days ago and had a headache for several days post    Therapeutic Exercise: (30 min min) Done in order to improve strength, ROM and understanding of current condition.     Date:  3-15-18 Date  3-27-18   Activity/Exercise Parameters    Education Discussed examination findings, HEP, plan of care Discussed HEP   Sleeper stretch & cross arm stretch Discussed and demonstrated  · Discussed and demonstrated   · Added standing cross arm stretch, set scapula first followed by [facility] IR and rotating trunk towards arm, 30 sec hold   Thoracic rotation  Side-lying, 10x, bilateral -   Diaphragmatic breathing  In supine, progressing to sitting    Scapula stabilization   · Worked on setting the scapula, done in side-lying and sitting w/ end range isometric resistance 20-30 sex, 4x  · Set scapula in sitting followed by shoulder ER in mid range, 10x, 2 sets, yellow   Manual Therapy: (30 min) Done in order to improve joint and soft tissue mobility,reduce muscle guarding, and decrease muscle tone  · Soft tissue mobilization of the left thoracic paraspinals   · PA glides to left TP's and rotational mobilization at R spinous process to facilitate right rotation   · L anterior and posterior capsule mobilization, MET at end range followed by neuro muscular re-ed  Modalities: (-) Done in order to reduce swelling and pain    Treatment/Session Assessment:  Patient tolerated the session well. Discussed importance of learning how to set her scapula with thoracic extension. We will continue to progress her plan of care. · Pain: Initial:    Minor soreness reported Post Session:  Minor soreness ·   Compliance with Program/Exercises: Will assess as treatment progresses. · Recommendations/Intent for next treatment session: \"Next visit will focus on progressing the patients plan of care\".     Future Appointments  Date Time Provider Poonam Zapata   4/5/2018 8:30 AM Marisabel Tony, PT, DPT [facility]   4/10/2018 9:30 AM Marisabel Tony, PT, DPT [facility]     Total Treatment Duration: 60 min  PT Patient Time In/Time Out  Time In: 1030  Time Out: Sean 61 PT, DPT

## 2018-04-05 ENCOUNTER — HOSPITAL ENCOUNTER (OUTPATIENT)
Dept: PHYSICAL THERAPY | Age: 63
Discharge: HOME | End: 2018-04-05
Payer: COMMERCIAL

## 2018-04-05 PROCEDURE — 97110 THERAPEUTIC EXERCISES: CPT

## 2018-04-05 PROCEDURE — 97140 MANUAL THERAPY 1/> REGIONS: CPT

## 2018-04-05 NOTE — PROGRESS NOTES
Caryl Govea  : 1955  Primary: Venu Alvaro Ppo  Secondary:  [address]  at [address]  [phone]   [phone]         OUTPATIENT PHYSICAL THERAPY:Daily Note 2018    ICD-10: Treatment Diagnosis: PAIN IN THORACIC SPINE M54.6; CERVICALGIA M54.2; NEUROMUSCULAR SCOLIOSIS, CERVICOTHORACIC REGION M41.43;  M75.02    Precautions/Allergies: non reported  Fall Risk Score: 0 (? 5 = High Risk)  MEDICAL/REFERRING DIAGNOSIS:Thoracic back pain [M54.6]  DATE OF ONSET: chronic   REFERRING PHYSICIAN:Pari Walter MD     INITIAL ASSESSMENT:  Ms. Nicol Shrestha presents to physical therapy with symptoms of left shoulder pain and mid back/neck pain . The examination reveals moderate IR limitations, scoliotic curvature causing limitations in rotation and sidebending ROM and altered breathing pattern. The examination is of low complexity due to a stable clinical presentation. Skilled physical therapy is recommended to progress the plan of care in order for the patient to return to full function with minimal symptoms. PROBLEM LIST (Impacting functional limitations):  1. Decreased Strength  2. Decreased ADL/Functional Activities  3. Increased Pain  4. Decreased Activity Tolerance  5. Decreased Flexibility/Joint Mobility  6. Decreased [place] with Home Exercise Program INTERVENTIONS PLANNED:  1. Cold  2. Heat  3. Home Exercise Program (HEP)  4. Manual Therapy  5. Range of Motion (ROM)  6. Therapeutic Exercise/Strengthening     TREATMENT PLAN:  Effective Dates: 3-15-18 TO 5-15-18. Frequency/Duration: 2-4 times a month for 2 months   GOALS: (Goals have been discussed and agreed upon with patient.)  SHORT-TERM FUNCTIONAL GOALS: Time Frame: 2-4 wks  1. Patient will report 25-50% reduction in overall symptoms  2. Patient will be compliant with HEP and plan of care  3. Patient will improve IR by 10 deg  4.   Patient will improve on the Oswestry by 3-5 points  DISCHARGE GOALS: Time Frame: 6-8 wk  1. Patient will report % reduction in overall symptoms in order to be able to have full function with decreased symptoms  2. Patient will be able to hike 3-5 miles with minimal increase in symptoms   3. Patient will be able to self manage her condition in order to continue living an active life style  4. Patient will improve on the Oswestry  by 8-10 points in order to demonstrate improved function with less pain    Rehabilitation Potential For Stated Goals: Good    Regarding Leroy Kaufmann therapy, I certify that the treatment plan above will be carried out by a therapist or under their direction. Pratibha Alas PT,DPT              HISTORY:   History of Present Injury/Illness (Reason for Referral): Reports that she developed frozen shoulder on the left side for which she went to therapy while she was in [place]. She is feeling some better from the shoulder. Her neck and mid back have been okay but she has not been as active. Past Medical History/Comorbidities:   Past Medical History:   Diagnosis Date    Osteoarthritis of hand     Osteopenia      Social History/Living Environment: lives with  in a single family home  Prior Level of Function/Work/Activity:independent, retired   Current Medications:     Current Outpatient Prescriptions:     ESTRACE 0.01 % (0.1 mg/gram) vaginal cream, , Disp: , Rfl:     CALCIUM CARBONATE (CALCIUM 300 PO), Take 1 Tab by mouth daily. , Disp: , Rfl:     calcium 500 mg Tab, Take 1 Tab by mouth daily. , Disp: , Rfl:     Cholecalciferol, Vitamin D3, (VITAMIN D3) 1,000 unit cap, Take  by mouth., Disp: , Rfl:     ASCORBATE CALCIUM (VITAMIN C PO), Take 1 Tab by mouth daily. , Disp: , Rfl:     FLAXSEED OIL (OMEGA 3 PO), Take 1 Tab by mouth daily. , Disp: , Rfl:     MAGNESIUM PO, Take 1 Tab by mouth daily. , Disp: , Rfl:     SELENIUM PO, Take  by mouth., Disp: , Rfl:     OTHER, Vit k2 liquid once daily, Disp: , Rfl:   Date Last Reviewed:  4/5/2018   Number of Personal Factors/Comorbidities that affect the Plan of Care: 0: LOW COMPLEXITY   EXAMINATION:   (Abbreviations: P-pain, NP- no pain, wnl-within normal limits)  Observation/Orthostatic Postural Assessment:    Standing resting posture:  · Scoliotic curvature noted, convex L in thoracic, convex R TL junction region  · Rotated L in mid thoracic   · Flexed at T1-T2    Palpation/tone/tissue texture:    Soft tissue:  · Upper traps: mild tightness bilateral   · Levatore scapulae: mild tightness bilateral  · Sternocleidomastoid: n/a   · Scalenes:n/a  · Suboccipitals:n/a     PAIVM: (passive accessory intervertebral motion)  · C0-C1:n/a  · C1-C2:n/a  · Mid Cervical: wnl  · Lower Cervical: relatively wnl  · Upper thoracic: hypomobile  · Mid thoracic:hypomobile on L side    Breathing Assessment:  · Upper chest (pump handle): present with deeper breathing  · Mid thoracic (bucket handle): dominant   · Lower rib cage (caliper): minimal A-P motion        ROM:  (P-pain  NP-no pain)  Cervical ROM  (tested in sitting) Date:  3-15-18       Flexion wnl   Extension wnl   Rotation L 75 deg   Rotation R 80 deg   Quadrant exten n/a   Quadrant flex n/a     Shoulder ROM Date:  3-15-18       Right Left   Flexion wnl End range limitation    Abduction wnl End range limitation   IR T4 T11  (20 deg)   ER T4 T2    Thoracic ROM  (tested in sitting) Date:  3-15-18       Flexion wnl   Extension Mild limitation in mid thoracic   Rotation L 25%   Rotation R 25%   SB R 50%, C curve noted   SB L 50%, C curve noted           Strength:   Shoulder strength Date:  3-15-18       Right Left   Flexion 5 5   Abduction 5 5   IR 5 5   ER 5 5        Scapular stability Date:  3-15-18       Right Left   Mid trap 4+ 4+   Low trap 4- 3+   Rhomboids 4 4     Cervical Strength Date:  3-15-18   Chin tuck: n/a             Special Tests: -  Neurological Screen:   Myotomes: strong in bilateral UE's         Dermatomes: intact in bilateral UE's Reflexes: n/a         Neural Tension Tests: ULTT (median):   Functional Mobility:    Difficulty with behind the back motions of the arm  Balance:-   CLINICAL DECISION MAKING:   Outcome Measure: Tool Used: Modified Oswestry Low Back Pain Questionnaire  Score:  Initial: 10/50  Most Recent: X/50 (Date: -- )   Interpretation of Score: Each section is scored on a 0-5 scale, 5 representing the greatest disability. The scores of each section are added together for a total score of 50. Score 0 1-10 11-20 21-30 31-40 41-49 50   Modifier CH CI CJ CK CL CM CN       Medical Necessity:   · Patient is expected to demonstrate progress in strength, range of motion and symptom levels to return to full function. Reason for Services/Other Comments:  · Patient continues to require skilled intervention due to ongoing symptoms. Use of outcome tool(s) and clinical judgement create a POC that gives a: Clear prediction of patient's progress: LOW COMPLEXITY   TREATMENT:   (In addition to Assessment/Re-Assessment sessions the following treatments were rendered)    Subjective Pre-Treatment Symptoms: reports that she did pretty good today but does report having a headache one night last week after walking downtown for several miles throughout the day    Therapeutic Exercise: (30 min) Done in order to improve strength, ROM and understanding of current condition.     Date:  3-15-18 Date  3-27-18 Date  4-5-18   Activity/Exercise Parameters     Education Discussed examination findings, HEP, plan of care Discussed HEP Discussed HEP   Sleeper stretch & cross arm stretch Discussed and demonstrated  · Discussed and demonstrated   · Added standing cross arm stretch, set scapula first followed by [address] IR and rotating trunk towards arm, 30 sec hold · Discussed cross arm stretch    Thoracic rotation  Side-lying, 10x, bilateral - -   Diaphragmatic breathing  In supine, progressing to sitting  discussed   Scapula stabilization   · Worked on setting the scapula, done in side-lying and sitting w/ end range isometric resistance 20-30 sex, 4x  · Set scapula in sitting followed by shoulder ER in mid range, 10x, 2 sets, yellow · In side-lying, working on activation of scapular stabilizers, 10 sec hold, 5x  · W/ ER and IR manual  resistance   · Set scapula in sitting followed by shoulder ER in mid range, 10x, 2 sets, yellow   Self 1st rib inferior glide  ·  · Used belt to facilitate, 20 sec hold, 3x   Manual Therapy: (20min) Done in order to improve joint and soft tissue mobility,reduce muscle guarding, and decrease muscle tone  · Soft tissue mobilization of the left thoracic paraspinals, upper trap  · 1st rib inferior glide MET  · PA glides to left TP's and rotational mobilization at R spinous process to facilitate right rotation   · L anterior and posterior capsule mobilization, MET at end range followed by neuro muscular re-ed  Modalities: (10 min) Done in order to reduce swelling and pain  Heat x 10 min to upper trap region    Treatment/Session Assessment:  Patient tolerated the session well. She still has issues with endurance of her scapular stabilizers but getting better at setting the shoulder blade. Discussed updated HEP    · Pain: Initial:  0/10 at rest Post Session:  0/10 at rest ·   Compliance with Program/Exercises: Will assess as treatment progresses. · Recommendations/Intent for next treatment session: \"Next visit will focus on progressing the patients plan of care\".     Future Appointments  Date Time Provider Poonam Zapata   4/12/2018 9:30 AM Verna Bear, PT, DPT [facility]   4/19/2018 3:00 PM Verna Bear PT, DPT [facility]     Total Treatment Duration: 60 min  PT Patient Time In/Time Out  Time In: 0830  Time Out: Deepali Quintanilla PT, DPT

## 2018-04-10 ENCOUNTER — APPOINTMENT (OUTPATIENT)
Dept: PHYSICAL THERAPY | Age: 63
End: 2018-04-10
Payer: COMMERCIAL

## 2018-04-12 ENCOUNTER — HOSPITAL ENCOUNTER (OUTPATIENT)
Dept: PHYSICAL THERAPY | Age: 63
Discharge: HOME | End: 2018-04-12
Payer: COMMERCIAL

## 2018-04-12 PROCEDURE — 97110 THERAPEUTIC EXERCISES: CPT

## 2018-04-12 PROCEDURE — 97140 MANUAL THERAPY 1/> REGIONS: CPT

## 2018-04-12 NOTE — PROGRESS NOTES
Eddie Garcia  : 1955  Primary: Vero Foley Ppo  Secondary:  [address] at [address]  [phone]   [phone]         OUTPATIENT PHYSICAL THERAPY:Daily Note 2018    ICD-10: Treatment Diagnosis: PAIN IN THORACIC SPINE M54.6; CERVICALGIA M54.2; NEUROMUSCULAR SCOLIOSIS, CERVICOTHORACIC REGION M41.43;  M75.02    Precautions/Allergies: non reported  Fall Risk Score: 0 (? 5 = High Risk)  MEDICAL/REFERRING DIAGNOSIS:Thoracic back pain [M54.6]  DATE OF ONSET: chronic   REFERRING PHYSICIAN:Tran Walter MD     INITIAL ASSESSMENT:  Ms. Stephany Marks presents to physical therapy with symptoms of left shoulder pain and mid back/neck pain . The examination reveals moderate IR limitations, scoliotic curvature causing limitations in rotation and sidebending ROM and altered breathing pattern. The examination is of low complexity due to a stable clinical presentation. Skilled physical therapy is recommended to progress the plan of care in order for the patient to return to full function with minimal symptoms. PROBLEM LIST (Impacting functional limitations):  1. Decreased Strength  2. Decreased ADL/Functional Activities  3. Increased Pain  4. Decreased Activity Tolerance  5. Decreased Flexibility/Joint Mobility  6. Decreased [place] with Home Exercise Program INTERVENTIONS PLANNED:  1. Cold  2. Heat  3. Home Exercise Program (HEP)  4. Manual Therapy  5. Range of Motion (ROM)  6. Therapeutic Exercise/Strengthening     TREATMENT PLAN:  Effective Dates: 3-15-18 TO 5-15-18. Frequency/Duration: 2-4 times a month for 2 months   GOALS: (Goals have been discussed and agreed upon with patient.)  SHORT-TERM FUNCTIONAL GOALS: Time Frame: 2-4 wks  1. Patient will report 25-50% reduction in overall symptoms  2. Patient will be compliant with HEP and plan of care  3. Patient will improve IR by 10 deg  4.   Patient will improve on the Oswestry by 3-5 points  DISCHARGE GOALS: Time Frame: 6-8 wk  1. Patient will report % reduction in overall symptoms in order to be able to have full function with decreased symptoms  2. Patient will be able to hike 3-5 miles with minimal increase in symptoms   3. Patient will be able to self manage her condition in order to continue living an active life style  4. Patient will improve on the Oswestry  by 8-10 points in order to demonstrate improved function with less pain    Rehabilitation Potential For Stated Goals: Good    Regarding Mariel Palmer therapy, I certify that the treatment plan above will be carried out by a therapist or under their direction. Denise Tracy PT,DPT              HISTORY:   History of Present Injury/Illness (Reason for Referral): Reports that she developed frozen shoulder on the left side for which she went to therapy while she was in [place]. She is feeling some better from the shoulder. Her neck and mid back have been okay but she has not been as active. Past Medical History/Comorbidities:   Past Medical History:   Diagnosis Date    Osteoarthritis of hand     Osteopenia      Social History/Living Environment: lives with  in a single family home  Prior Level of Function/Work/Activity:independent, retired   Current Medications:     Current Outpatient Prescriptions:     ESTRACE 0.01 % (0.1 mg/gram) vaginal cream, , Disp: , Rfl:     CALCIUM CARBONATE (CALCIUM 300 PO), Take 1 Tab by mouth daily. , Disp: , Rfl:     calcium 500 mg Tab, Take 1 Tab by mouth daily. , Disp: , Rfl:     Cholecalciferol, Vitamin D3, (VITAMIN D3) 1,000 unit cap, Take  by mouth., Disp: , Rfl:     ASCORBATE CALCIUM (VITAMIN C PO), Take 1 Tab by mouth daily. , Disp: , Rfl:     FLAXSEED OIL (OMEGA 3 PO), Take 1 Tab by mouth daily. , Disp: , Rfl:     MAGNESIUM PO, Take 1 Tab by mouth daily. , Disp: , Rfl:     SELENIUM PO, Take  by mouth., Disp: , Rfl:     OTHER, Vit k2 liquid once daily, Disp: , Rfl:   Date Last Reviewed:  4/12/2018   Number of Personal Factors/Comorbidities that affect the Plan of Care: 0: LOW COMPLEXITY   EXAMINATION:   (Abbreviations: P-pain, NP- no pain, wnl-within normal limits)  Observation/Orthostatic Postural Assessment:    Standing resting posture:  · Scoliotic curvature noted, convex L in thoracic, convex R TL junction region  · Rotated L in mid thoracic   · Flexed at T1-T2    Palpation/tone/tissue texture:    Soft tissue:  · Upper traps: mild tightness bilateral   · Levatore scapulae: mild tightness bilateral  · Sternocleidomastoid: n/a   · Scalenes:n/a  · Suboccipitals:n/a     PAIVM: (passive accessory intervertebral motion)  · C0-C1:n/a  · C1-C2:n/a  · Mid Cervical: wnl  · Lower Cervical: relatively wnl  · Upper thoracic: hypomobile  · Mid thoracic:hypomobile on L side    Breathing Assessment:  · Upper chest (pump handle): present with deeper breathing  · Mid thoracic (bucket handle): dominant   · Lower rib cage (caliper): minimal A-P motion        ROM:  (P-pain  NP-no pain)  Cervical ROM  (tested in sitting) Date:  3-15-18       Flexion wnl   Extension wnl   Rotation L 75 deg   Rotation R 80 deg   Quadrant exten n/a   Quadrant flex n/a     Shoulder ROM Date:  3-15-18       Right Left   Flexion wnl End range limitation    Abduction wnl End range limitation   IR T4 T11  (20 deg)   ER T4 T2    Thoracic ROM  (tested in sitting) Date:  3-15-18       Flexion wnl   Extension Mild limitation in mid thoracic   Rotation L 25%   Rotation R 25%   SB R 50%, C curve noted   SB L 50%, C curve noted           Strength:   Shoulder strength Date:  3-15-18       Right Left   Flexion 5 5   Abduction 5 5   IR 5 5   ER 5 5        Scapular stability Date:  3-15-18       Right Left   Mid trap 4+ 4+   Low trap 4- 3+   Rhomboids 4 4     Cervical Strength Date:  3-15-18   Chin tuck: n/a             Special Tests: -  Neurological Screen:   Myotomes: strong in bilateral UE's         Dermatomes: intact in bilateral UE's Reflexes: n/a         Neural Tension Tests: ULTT (median):   Functional Mobility:    Difficulty with behind the back motions of the arm  Balance:-   CLINICAL DECISION MAKING:   Outcome Measure: Tool Used: Modified Oswestry Low Back Pain Questionnaire  Score:  Initial: 10/50  Most Recent: X/50 (Date: -- )   Interpretation of Score: Each section is scored on a 0-5 scale, 5 representing the greatest disability. The scores of each section are added together for a total score of 50. Score 0 1-10 11-20 21-30 31-40 41-49 50   Modifier CH CI CJ CK CL CM CN       Medical Necessity:   · Patient is expected to demonstrate progress in strength, range of motion and symptom levels to return to full function. Reason for Services/Other Comments:  · Patient continues to require skilled intervention due to ongoing symptoms. Use of outcome tool(s) and clinical judgement create a POC that gives a: Clear prediction of patient's progress: LOW COMPLEXITY   TREATMENT:   (In addition to Assessment/Re-Assessment sessions the following treatments were rendered)    Subjective Pre-Treatment Symptoms: reports that her neck and headaches have been pretty good but she has had more shoulder soreness when she goes to bed. Therapeutic Exercise: (30 min) Done in order to improve strength, ROM and understanding of current condition.     Date  3-27-18 Date  4-5-18 Date  4-12-18   Activity/Exercise      Education Discussed HEP Discussed HEP Discussed HEP   Sleeper stretch & cross arm stretch · Discussed and demonstrated   · Added standing cross arm stretch, set scapula first followed by [facility] IR and rotating trunk towards arm, 30 sec hold · Discussed cross arm stretch  · Cross arm stretch only to minimize impingement   Thoracic rotation  - - -   Diaphragmatic breathing   discussed -   Scapula stabilization  · Worked on setting the scapula, done in side-lying and sitting w/ end range isometric resistance 20-30 sex, 4x  · Set scapula in sitting followed by shoulder ER in mid range, 10x, 2 sets, yellow · In side-lying, working on activation of scapular stabilizers, 10 sec hold, 5x  · W/ ER and IR manual  resistance   · Set scapula in sitting followed by shoulder ER in mid range, 10x, 2 sets, yellow · In side-lying, working on activation of scapular stabilizers, 10 sec hold, 5x  · W/ ER and IR manual  resistance   · Set scapula in side-lying followed by ER, kept arm at 0 deg abd, 10x, 3 sets  · Forward elevation in side-lying, II to floor keeping scapula set   Self 1st rib inferior glide ·  · Used belt to facilitate, 20 sec hold, 3x · HEP   Manual Therapy: (25min) Done in order to improve joint and soft tissue mobility,reduce muscle guarding, and decrease muscle tone  · Soft tissue mobilization of the left thoracic paraspinals, upper trap  · 1st rib inferior glide MET  · PA glides to left TP's and rotational mobilization at R spinous process to facilitate right rotation  (NOT DONE TODAY)  · L anterior and posterior capsule mobilization, MET at end range followed by neuro muscular re-ed  Modalities: (- min) Done in order to reduce swelling and pain  -    Treatment/Session Assessment:  Patient tolerated the session well. She still capsular limitations in the shoulder but progressing overall. Discussed progression of her scapular stabilizers and avoiding sleeper stretch for now. · Pain: Initial:  Reports more shoulder soreness over the last couple days Post Session:  Mild soreness reported. ·   Compliance with Program/Exercises: Will assess as treatment progresses. · Recommendations/Intent for next treatment session: \"Next visit will focus on progressing the patients plan of care\".     Future Appointments  Date Time Provider Poonam Zapata   4/19/2018 3:00 PM Judi Evans, PT, DPT [facility]   5/1/2018 10:30 AM Judi Evans, PT, DPT [facility]     Total Treatment Duration: 55 min  PT Patient Time In/Time Out  Time In: 5166  Time Out: [address] PT, DPT

## 2018-04-19 ENCOUNTER — HOSPITAL ENCOUNTER (OUTPATIENT)
Dept: PHYSICAL THERAPY | Age: 63
Discharge: HOME | End: 2018-04-19
Payer: COMMERCIAL

## 2018-04-19 PROCEDURE — 97140 MANUAL THERAPY 1/> REGIONS: CPT

## 2018-04-19 PROCEDURE — 97110 THERAPEUTIC EXERCISES: CPT

## 2018-04-19 NOTE — PROGRESS NOTES
Oralia [place]  : 1955  Primary: Ida Kolb Ppo  Secondary:  [address] at [address]  [phone]   [phone]         OUTPATIENT PHYSICAL THERAPY:Daily Note 2018    ICD-10: Treatment Diagnosis: PAIN IN THORACIC SPINE M54.6; CERVICALGIA M54.2; NEUROMUSCULAR SCOLIOSIS, CERVICOTHORACIC REGION M41.43;  M75.02    Precautions/Allergies: non reported  Fall Risk Score: 0 (? 5 = High Risk)  MEDICAL/REFERRING DIAGNOSIS:Thoracic back pain [M54.6]  DATE OF ONSET: chronic   REFERRING PHYSICIAN:Lena Walter MD     INITIAL ASSESSMENT:  Ms. Alec Gutierrez presents to physical therapy with symptoms of left shoulder pain and mid back/neck pain . The examination reveals moderate IR limitations, scoliotic curvature causing limitations in rotation and sidebending ROM and altered breathing pattern. The examination is of low complexity due to a stable clinical presentation. Skilled physical therapy is recommended to progress the plan of care in order for the patient to return to full function with minimal symptoms. PROBLEM LIST (Impacting functional limitations):  1. Decreased Strength  2. Decreased ADL/Functional Activities  3. Increased Pain  4. Decreased Activity Tolerance  5. Decreased Flexibility/Joint Mobility  6. Decreased [place] with Home Exercise Program INTERVENTIONS PLANNED:  1. Cold  2. Heat  3. Home Exercise Program (HEP)  4. Manual Therapy  5. Range of Motion (ROM)  6. Therapeutic Exercise/Strengthening     TREATMENT PLAN:  Effective Dates: 3-15-18 TO 5-15-18. Frequency/Duration: 2-4 times a month for 2 months   GOALS: (Goals have been discussed and agreed upon with patient.)  SHORT-TERM FUNCTIONAL GOALS: Time Frame: 2-4 wks  1. Patient will report 25-50% reduction in overall symptoms  2. Patient will be compliant with HEP and plan of care  3. Patient will improve IR by 10 deg  4.   Patient will improve on the Oswestry by 3-5 points  DISCHARGE GOALS: Time Frame: 6-8 wk  1. Patient will report % reduction in overall symptoms in order to be able to have full function with decreased symptoms  2. Patient will be able to hike 3-5 miles with minimal increase in symptoms   3. Patient will be able to self manage her condition in order to continue living an active life style  4. Patient will improve on the Oswestry  by 8-10 points in order to demonstrate improved function with less pain    Rehabilitation Potential For Stated Goals: Good    Regarding Manuel Massing therapy, I certify that the treatment plan above will be carried out by a therapist or under their direction. Kirill Gayle PT,DPT              HISTORY:   History of Present Injury/Illness (Reason for Referral): Reports that she developed frozen shoulder on the left side for which she went to therapy while she was in [place]. She is feeling some better from the shoulder. Her neck and mid back have been okay but she has not been as active. Past Medical History/Comorbidities:   Past Medical History:   Diagnosis Date    Osteoarthritis of hand     Osteopenia      Social History/Living Environment: lives with  in a single family home  Prior Level of Function/Work/Activity:independent, retired   Current Medications:     Current Outpatient Prescriptions:     ESTRACE 0.01 % (0.1 mg/gram) vaginal cream, , Disp: , Rfl:     CALCIUM CARBONATE (CALCIUM 300 PO), Take 1 Tab by mouth daily. , Disp: , Rfl:     calcium 500 mg Tab, Take 1 Tab by mouth daily. , Disp: , Rfl:     Cholecalciferol, Vitamin D3, (VITAMIN D3) 1,000 unit cap, Take  by mouth., Disp: , Rfl:     ASCORBATE CALCIUM (VITAMIN C PO), Take 1 Tab by mouth daily. , Disp: , Rfl:     FLAXSEED OIL (OMEGA 3 PO), Take 1 Tab by mouth daily. , Disp: , Rfl:     MAGNESIUM PO, Take 1 Tab by mouth daily. , Disp: , Rfl:     SELENIUM PO, Take  by mouth., Disp: , Rfl:     OTHER, Vit k2 liquid once daily, Disp: , Rfl:   Date Last Reviewed:  4/22/2018   Number of Personal Factors/Comorbidities that affect the Plan of Care: 0: LOW COMPLEXITY   EXAMINATION:   (Abbreviations: P-pain, NP- no pain, wnl-within normal limits)  Observation/Orthostatic Postural Assessment:    Standing resting posture:  · Scoliotic curvature noted, convex L in thoracic, convex R TL junction region  · Rotated L in mid thoracic   · Flexed at T1-T2    Palpation/tone/tissue texture:    Soft tissue:  · Upper traps: mild tightness bilateral   · Levatore scapulae: mild tightness bilateral  · Sternocleidomastoid: n/a   · Scalenes:n/a  · Suboccipitals:n/a     PAIVM: (passive accessory intervertebral motion)  · C0-C1:n/a  · C1-C2:n/a  · Mid Cervical: wnl  · Lower Cervical: relatively wnl  · Upper thoracic: hypomobile  · Mid thoracic:hypomobile on L side    Breathing Assessment:  · Upper chest (pump handle): present with deeper breathing  · Mid thoracic (bucket handle): dominant   · Lower rib cage (caliper): minimal A-P motion        ROM:  (P-pain  NP-no pain)  Cervical ROM  (tested in sitting) Date:  3-15-18       Flexion wnl   Extension wnl   Rotation L 75 deg   Rotation R 80 deg   Quadrant exten n/a   Quadrant flex n/a     Shoulder ROM Date:  3-15-18       Right Left   Flexion wnl End range limitation    Abduction wnl End range limitation   IR T4 T11  (20 deg)   ER T4 T2    Thoracic ROM  (tested in sitting) Date:  3-15-18       Flexion wnl   Extension Mild limitation in mid thoracic   Rotation L 25%   Rotation R 25%   SB R 50%, C curve noted   SB L 50%, C curve noted           Strength:   Shoulder strength Date:  3-15-18       Right Left   Flexion 5 5   Abduction 5 5   IR 5 5   ER 5 5        Scapular stability Date:  3-15-18       Right Left   Mid trap 4+ 4+   Low trap 4- 3+   Rhomboids 4 4     Cervical Strength Date:  3-15-18   Chin tuck: n/a             Special Tests: -  Neurological Screen:   Myotomes: strong in bilateral UE's         Dermatomes: intact in bilateral UE's Reflexes: n/a         Neural Tension Tests: ULTT (median):   Functional Mobility:    Difficulty with behind the back motions of the arm  Balance:-   CLINICAL DECISION MAKING:   Outcome Measure: Tool Used: Modified Oswestry Low Back Pain Questionnaire  Score:  Initial: 10/50  Most Recent: X/50 (Date: -- )   Interpretation of Score: Each section is scored on a 0-5 scale, 5 representing the greatest disability. The scores of each section are added together for a total score of 50. Score 0 1-10 11-20 21-30 31-40 41-49 50   Modifier CH CI CJ CK CL CM CN       Medical Necessity:   · Patient is expected to demonstrate progress in strength, range of motion and symptom levels to return to full function. Reason for Services/Other Comments:  · Patient continues to require skilled intervention due to ongoing symptoms. Use of outcome tool(s) and clinical judgement create a POC that gives a: Clear prediction of patient's progress: LOW COMPLEXITY   TREATMENT:   (In addition to Assessment/Re-Assessment sessions the following treatments were rendered)    Subjective Pre-Treatment Symptoms: reports that her neck and headaches have been pretty good but she has had more shoulder soreness when she goes to bed. Therapeutic Exercise: (30 min) Done in order to improve strength, ROM and understanding of current condition.     Date  4-5-18 Date  4-12-18 Date  4-19-18   Activity/Exercise      Education Discussed HEP Discussed HEP Discussed updated HEP   Sleeper stretch & cross arm stretch · Discussed cross arm stretch  · Cross arm stretch only to minimize impingement HEP   Thoracic rotation  - - HEP   Diaphragmatic breathing  discussed - discussed   Scapula stabilization  · In side-lying, working on activation of scapular stabilizers, 10 sec hold, 5x  · W/ ER and IR manual  resistance   · Set scapula in sitting followed by shoulder ER in mid range, 10x, 2 sets, yellow · In side-lying, working on activation of scapular stabilizers, 10 sec hold, 5x  · W/ ER and IR manual  resistance   · Set scapula in side-lying followed by ER, kept arm at 0 deg abd, 10x, 3 sets  · Forward elevation in side-lying, II to floor keeping scapula set · W/ ER and IR manual  resistance   · Set scapula in sitting followed by ER, kept arm at 0 deg abd, 10x, 3 sets  · Supine forward elevation, with ER resistance, to nose level, maintain ER activation, yellow band, 10x, 2 sets   Self 1st rib inferior glide · Used belt to facilitate, 20 sec hold, 3x · HEP · HEP   Low Y's   Yellow band, 10x, 2 sets; bring arms down and ER after setting the scapula   Pec stretch   Doorway stretch 30 sec   Manual Therapy: (25min) Done in order to improve joint and soft tissue mobility,reduce muscle guarding, and decrease muscle tone  · Soft tissue mobilization of pec major, scalenes and upper trap  · 1st rib inferior glide MET  · PA glides to left TP's and rotational mobilization at R spinous process to facilitate right rotation  (NOT DONE TODAY)  · L anterior and posterior capsule mobilization, MET at end range followed by neuro muscular re-ed  Modalities: (- min) Done in order to reduce swelling and pain  -    Treatment/Session Assessment:  Patient tolerated the session well. She is getting better at stabilizing her scapula and her glenohumeral joint is improving ROM. · Pain: Initial:  Reports more shoulder soreness over the last couple days Post Session:  Mild soreness reported. ·   Compliance with Program/Exercises: Will assess as treatment progresses. · Recommendations/Intent for next treatment session: \"Next visit will focus on progressing the patients plan of care\".     Future Appointments  Date Time Provider Poonam Zapata   5/1/2018 10:30 AM Fina Chou PT, DPT [facility]   8/21/2018 9:00 AM CMW ULTRASOUND SSA CMW CMW     Total Treatment Duration: 55 min  PT Patient Time In/Time Out  Time In: 1500  Time Out: [address]  PT, DPT

## 2018-05-01 ENCOUNTER — HOSPITAL ENCOUNTER (OUTPATIENT)
Dept: PHYSICAL THERAPY | Age: 63
Discharge: HOME | End: 2018-05-01
Payer: COMMERCIAL

## 2018-05-01 PROCEDURE — 97110 THERAPEUTIC EXERCISES: CPT

## 2018-05-01 PROCEDURE — 97140 MANUAL THERAPY 1/> REGIONS: CPT

## 2018-05-01 NOTE — PROGRESS NOTES
Oralia [place]  : 1955  Primary: Ida Kolb Ppo  Secondary:  Therapy Center at [address] [place] [address]  [phone]   [phone]         OUTPATIENT PHYSICAL THERAPY:Daily Note 2018    ICD-10: Treatment Diagnosis: PAIN IN THORACIC SPINE M54.6; CERVICALGIA M54.2; NEUROMUSCULAR SCOLIOSIS, CERVICOTHORACIC REGION M41.43;  M75.02    Precautions/Allergies: non reported  Fall Risk Score: 0 (? 5 = High Risk)  MEDICAL/REFERRING DIAGNOSIS:Thoracic back pain [M54.6]  DATE OF ONSET: chronic   REFERRING PHYSICIAN:Lena Walter MD     INITIAL ASSESSMENT:  Ms. Alec Gutierrez presents to physical therapy with symptoms of left shoulder pain and mid back/neck pain . The examination reveals moderate IR limitations, scoliotic curvature causing limitations in rotation and sidebending ROM and altered breathing pattern. The examination is of low complexity due to a stable clinical presentation. Skilled physical therapy is recommended to progress the plan of care in order for the patient to return to full function with minimal symptoms. PROBLEM LIST (Impacting functional limitations):  1. Decreased Strength  2. Decreased ADL/Functional Activities  3. Increased Pain  4. Decreased Activity Tolerance  5. Decreased Flexibility/Joint Mobility  6. Decreased [place] with Home Exercise Program INTERVENTIONS PLANNED:  1. Cold  2. Heat  3. Home Exercise Program (HEP)  4. Manual Therapy  5. Range of Motion (ROM)  6. Therapeutic Exercise/Strengthening     TREATMENT PLAN:  Effective Dates: 3-15-18 TO 5-15-18. Frequency/Duration: 2-4 times a month for 2 months   GOALS: (Goals have been discussed and agreed upon with patient.)  SHORT-TERM FUNCTIONAL GOALS: Time Frame: 2-4 wks  1. Patient will report 25-50% reduction in overall symptoms  2. Patient will be compliant with HEP and plan of care  3. Patient will improve IR by 10 deg  4.   Patient will improve on the Oswestry by 3-5 points  DISCHARGE GOALS: Time Frame: 6-8 wk  1. Patient will report % reduction in overall symptoms in order to be able to have full function with decreased symptoms  2. Patient will be able to hike 3-5 miles with minimal increase in symptoms   3. Patient will be able to self manage her condition in order to continue living an active life style  4. Patient will improve on the Oswestry  by 8-10 points in order to demonstrate improved function with less pain    Rehabilitation Potential For Stated Goals: Good    Regarding Roni Mayo therapy, I certify that the treatment plan above will be carried out by a therapist or under their direction. Milton Macdonald PT,DPT              HISTORY:   History of Present Injury/Illness (Reason for Referral): Reports that she developed frozen shoulder on the left side for which she went to therapy while she was in [place]. She is feeling some better from the shoulder. Her neck and mid back have been okay but she has not been as active. Past Medical History/Comorbidities:   Past Medical History:   Diagnosis Date    Osteoarthritis of hand     Osteopenia      Social History/Living Environment: lives with  in a single family home  Prior Level of Function/Work/Activity:independent, retired   Current Medications:     Current Outpatient Prescriptions:     ESTRACE 0.01 % (0.1 mg/gram) vaginal cream, , Disp: , Rfl:     CALCIUM CARBONATE (CALCIUM 300 PO), Take 1 Tab by mouth daily. , Disp: , Rfl:     calcium 500 mg Tab, Take 1 Tab by mouth daily. , Disp: , Rfl:     Cholecalciferol, Vitamin D3, (VITAMIN D3) 1,000 unit cap, Take  by mouth., Disp: , Rfl:     ASCORBATE CALCIUM (VITAMIN C PO), Take 1 Tab by mouth daily. , Disp: , Rfl:     FLAXSEED OIL (OMEGA 3 PO), Take 1 Tab by mouth daily. , Disp: , Rfl:     MAGNESIUM PO, Take 1 Tab by mouth daily. , Disp: , Rfl:     SELENIUM PO, Take  by mouth., Disp: , Rfl:     OTHER, Vit k2 liquid once daily, Disp: , Rfl:   Date Last Reviewed:  5/1/2018   Number of Personal Factors/Comorbidities that affect the Plan of Care: 0: LOW COMPLEXITY   EXAMINATION:   (Abbreviations: P-pain, NP- no pain, wnl-within normal limits)  Observation/Orthostatic Postural Assessment:    Standing resting posture:  · Scoliotic curvature noted, convex L in thoracic, convex R TL junction region  · Rotated L in mid thoracic   · Flexed at T1-T2    Palpation/tone/tissue texture:    Soft tissue:  · Upper traps: mild tightness bilateral   · Levatore scapulae: mild tightness bilateral  · Sternocleidomastoid: n/a   · Scalenes:n/a  · Suboccipitals:n/a     PAIVM: (passive accessory intervertebral motion)  · C0-C1:n/a  · C1-C2:n/a  · Mid Cervical: wnl  · Lower Cervical: relatively wnl  · Upper thoracic: hypomobile  · Mid thoracic:hypomobile on L side    Breathing Assessment:  · Upper chest (pump handle): present with deeper breathing  · Mid thoracic (bucket handle): dominant   · Lower rib cage (caliper): minimal A-P motion        ROM:  (P-pain  NP-no pain)  Cervical ROM  (tested in sitting) Date:  3-15-18       Flexion wnl   Extension wnl   Rotation L 75 deg   Rotation R 80 deg   Quadrant exten n/a   Quadrant flex n/a     Shoulder ROM Date:  3-15-18       Right Left   Flexion wnl End range limitation    Abduction wnl End range limitation   IR T4 T11  (20 deg)   ER T4 T2    Thoracic ROM  (tested in sitting) Date:  3-15-18       Flexion wnl   Extension Mild limitation in mid thoracic   Rotation L 25%   Rotation R 25%   SB R 50%, C curve noted   SB L 50%, C curve noted           Strength:   Shoulder strength Date:  3-15-18       Right Left   Flexion 5 5   Abduction 5 5   IR 5 5   ER 5 5        Scapular stability Date:  3-15-18       Right Left   Mid trap 4+ 4+   Low trap 4- 3+   Rhomboids 4 4     Cervical Strength Date:  3-15-18   Chin tuck: n/a             Special Tests: -  Neurological Screen:   Myotomes: strong in bilateral UE's         Dermatomes: intact in bilateral UE's Reflexes: n/a         Neural Tension Tests: ULTT (median):   Functional Mobility:    Difficulty with behind the back motions of the arm  Balance:-   CLINICAL DECISION MAKING:   Outcome Measure: Tool Used: Modified Oswestry Low Back Pain Questionnaire  Score:  Initial: 10/50  Most Recent: X/50 (Date: -- )   Interpretation of Score: Each section is scored on a 0-5 scale, 5 representing the greatest disability. The scores of each section are added together for a total score of 50. Score 0 1-10 11-20 21-30 31-40 41-49 50   Modifier CH CI CJ CK CL CM CN       Medical Necessity:   · Patient is expected to demonstrate progress in strength, range of motion and symptom levels to return to full function. Reason for Services/Other Comments:  · Patient continues to require skilled intervention due to ongoing symptoms. Use of outcome tool(s) and clinical judgement create a POC that gives a: Clear prediction of patient's progress: LOW COMPLEXITY   TREATMENT:   (In addition to Assessment/Re-Assessment sessions the following treatments were rendered)    Subjective Pre-Treatment Symptoms: reports that her neck and headaches have been pretty good lately but her shoulder has been hurting her lately especially at night. Therapeutic Exercise: (15 min) Done in order to improve strength, ROM and understanding of current condition.     Date  4-12-18 Date  4-19-18 Date  5-1-18   Activity/Exercise      Education Discussed HEP Discussed updated HEP Discussed HEP   Sleeper stretch & cross arm stretch · Cross arm stretch only to minimize impingement HEP At 90 deg, 10x   Thoracic rotation  - HEP -   Diaphragmatic breathing  - discussed HEP   Scapula stabilization  · In side-lying, working on activation of scapular stabilizers, 10 sec hold, 5x  · W/ ER and IR manual  resistance   · Set scapula in side-lying followed by ER, kept arm at 0 deg abd, 10x, 3 sets  · Forward elevation in side-lying, II to floor keeping scapula set · W/ ER and IR manual  resistance   · Set scapula in sitting followed by ER, kept arm at 0 deg abd, 10x, 3 sets  · Supine forward elevation, with ER resistance, to nose level, maintain ER activation, yellow band, 10x, 2 sets · -   Self 1st rib inferior glide · HEP · HEP HEP   Low Y's  Yellow band, 10x, 2 sets; bring arms down and ER after setting the scapula -   Pec stretch  Doorway stretch 30 sec 30 sec, discussed proper form   Manual Therapy: (35min) Done in order to improve joint and soft tissue mobility,reduce muscle guarding, and decrease muscle tone  · Soft tissue mobilization to posterior deltoid, teres major,minor, lats and infraspinatus, used cupping tool   · 1st rib inferior glide MET  · L anterior and posterior capsule mobilization, grade IV, at 45-60 and 90 deg, followed by MET at end range followed by neuro muscular re-ed  · Inferior capsule glide, grade IV  Modalities: (- min) Done in order to reduce swelling and pain  -    Treatment/Session Assessment:  Patient tolerated the session well. She still has increased posterior GH tightness at 90 deg. Since her pain is mostly during periods of immobility, I suspect taht the pain is due to tightness. Discussed stopping the band exercises for several days to see if the pain decreases. Discussed her updated home program.     · Pain: Initial:  Reports more shoulder soreness over the last couple days Post Session:  Mild soreness reported. ·   Compliance with Program/Exercises: Will assess as treatment progresses. · Recommendations/Intent for next treatment session: \"Next visit will focus on progressing the patients plan of care\".     Future Appointments  Date Time Provider Poonam Zapata   5/15/2018 10:30 AM Judi Evans, PT, DPT [facility]   8/21/2018 9:00 AM CMW ULTRASOUND SSA CMW CMW     Total Treatment Duration: 55 min  PT Patient Time In/Time Out  Time In: 1035  Time Out: [address] PT, DPT

## 2018-05-10 ENCOUNTER — HOSPITAL ENCOUNTER (OUTPATIENT)
Dept: PHYSICAL THERAPY | Age: 63
Discharge: HOME | End: 2018-05-10
Payer: COMMERCIAL

## 2018-05-10 PROCEDURE — 97140 MANUAL THERAPY 1/> REGIONS: CPT

## 2018-05-10 PROCEDURE — 97110 THERAPEUTIC EXERCISES: CPT

## 2018-05-10 PROCEDURE — 97164 PT RE-EVAL EST PLAN CARE: CPT

## 2018-05-10 NOTE — THERAPY RECERTIFICATION
Caryl Govea  : 1955  Primary: Venu John Ppo  Secondary:  Therapy Center at [address] [place] [address]  [phone]   [phone]         OUTPATIENT PHYSICAL THERAPY:Daily Note and Re-evaluation 5/10/2018    ICD-10: Treatment Diagnosis: PAIN IN THORACIC SPINE M54.6; CERVICALGIA M54.2; NEUROMUSCULAR SCOLIOSIS, CERVICOTHORACIC REGION M41.43;  M75.02    Precautions/Allergies: non reported  Fall Risk Score: 0 (? 5 = High Risk)  MEDICAL/REFERRING DIAGNOSIS:Thoracic back pain [M54.6]  DATE OF ONSET: chronic   REFERRING PHYSICIAN:Pari Walter MD    ASSESSMENT:  Ms. Nicol Shrestha has come for a total of 7 visits since starting physical therapy on 3-15-18. During that time she has progressed with ROM of her glenohumeral joint and is able to better manage her cervical symptoms with less headache frequency. However she still has increased pain at night in her shoulder and continued scapular stabilizer weakness. Skilled physical therapy is recommended to continue progressing her plan of care. Thank you. PROBLEM LIST (Impacting functional limitations):  1. Decreased Strength  2. Decreased ADL/Functional Activities  3. Increased Pain  4. Decreased Activity Tolerance  5. Decreased Flexibility/Joint Mobility  6. Decreased [place] with Home Exercise Program INTERVENTIONS PLANNED:  1. Cold  2. Heat  3. Home Exercise Program (HEP)  4. Manual Therapy  5. Range of Motion (ROM)  6. Therapeutic Exercise/Strengthening     TREATMENT PLAN:  Effective Dates: 5-10-18 TO 8-10-18  Frequency/Duration: 1x/ 2 wks for 90 days   GOALS: (Goals have been discussed and agreed upon with patient.)  SHORT-TERM FUNCTIONAL GOALS: Time Frame: 2-4 wks  1. Patient will report 25-50% reduction in overall symptoms (MET)  2. Patient will be compliant with HEP and plan of care (MET)  3. Patient will improve IR by 10 deg (MET)  4.   Patient will improve on the Oswestry by 3-5 points   DISCHARGE GOALS: Time Frame: 6-8 wk  1. Patient will report % reduction in overall symptoms in order to be able to have full function with decreased symptoms (ongoing)  2. Patient will be able to hike 3-5 miles with minimal increase in symptoms  (MET)  3. Patient will be able to self manage her condition in order to continue living an active life style (ongoing)   4. Patient will improve on the Oswestry  by 8-10 points in order to demonstrate improved function with less pain (ongoing)  Rehabilitation Potential For Stated Goals: Good    Regarding Kenzie Angelucci therapy, I certify that the treatment plan above will be carried out by a therapist or under their direction. Royal Reveles PT,DPT              HISTORY:   History of Present Injury/Illness (Reason for Referral): Reports that she developed frozen shoulder on the left side for which she went to therapy while she was in [place]. She is feeling some better from the shoulder. Her neck and mid back have been okay but she has not been as active. Past Medical History/Comorbidities:   Past Medical History:   Diagnosis Date    Osteoarthritis of hand     Osteopenia      Social History/Living Environment: lives with  in a single family home  Prior Level of Function/Work/Activity:independent, retired   Current Medications:     Current Outpatient Prescriptions:     ESTRACE 0.01 % (0.1 mg/gram) vaginal cream, , Disp: , Rfl:     CALCIUM CARBONATE (CALCIUM 300 PO), Take 1 Tab by mouth daily. , Disp: , Rfl:     calcium 500 mg Tab, Take 1 Tab by mouth daily. , Disp: , Rfl:     Cholecalciferol, Vitamin D3, (VITAMIN D3) 1,000 unit cap, Take  by mouth., Disp: , Rfl:     ASCORBATE CALCIUM (VITAMIN C PO), Take 1 Tab by mouth daily. , Disp: , Rfl:     FLAXSEED OIL (OMEGA 3 PO), Take 1 Tab by mouth daily. , Disp: , Rfl:     MAGNESIUM PO, Take 1 Tab by mouth daily. , Disp: , Rfl:     SELENIUM PO, Take  by mouth., Disp: , Rfl:     OTHER, Vit k2 liquid once daily, Disp: , Rfl: Date Last Reviewed:  5/13/2018   Number of Personal Factors/Comorbidities that affect the Plan of Care: 0: LOW COMPLEXITY   EXAMINATION:   (Abbreviations: P-pain, NP- no pain, wnl-within normal limits)  Observation/Orthostatic Postural Assessment:    Standing resting posture:  · Scoliotic curvature noted, convex L in thoracic, convex R TL junction region  · Rotated L in mid thoracic   · Flexed at T1-T2    Palpation/tone/tissue texture:    Soft tissue:  · Upper traps: mild tightness bilateral   · Levatore scapulae: mild tightness bilateral  · Sternocleidomastoid: n/a   · Scalenes:n/a  · Suboccipitals:n/a     PAIVM: (passive accessory intervertebral motion)  · C0-C1:n/a  · C1-C2:n/a  · Mid Cervical: wnl  · Lower Cervical: relatively wnl  · Upper thoracic: hypomobile  · Mid thoracic:hypomobile on L side    Breathing Assessment:  · Upper chest (pump handle): present with deeper breathing  · Mid thoracic (bucket handle): dominant   · Lower rib cage (caliper): minimal A-P motion  ·     ROM:  (P-pain  NP-no pain)  Cervical ROM  (tested in sitting) Date:  5-10-18       Flexion wnl   Extension wnl   Rotation L 75 deg   Rotation R 80 deg   Quadrant exten n/a   Quadrant flex n/a     Shoulder ROM Date:  5-10-18       Right Left   Flexion wnl End range limitation    Abduction wnl End range limitation   IR 75 deg 60 deg    deg 80 deg    Thoracic ROM  (tested in sitting) Date:  5-10-18       Flexion wnl   Extension Mild limitation in mid thoracic   Rotation L 50%   Rotation R 50%   SB R 50%, C curve noted   SB L 50%, C curve noted           Strength:   Shoulder strength Date:  5-10-18       Right Left   Flexion 5 5   Abduction 5 5   IR 5 5   ER 5 5        Scapular stability Date:  5-10-18       Right Left   Mid trap 4+ 4+   Low trap 4- 4-   Rhomboids 4 4     Cervical Strength Date:  5-10-18   Chin tuck: n/a             Special Tests: -  Neurological Screen:   Myotomes: strong in bilateral UE's         Dermatomes: intact in bilateral UE's         Reflexes: n/a         Neural Tension Tests: ULTT (median):   Functional Mobility:    Difficulty with behind the back motions of the arm  Balance:-   CLINICAL DECISION MAKING:   Outcome Measure: Tool Used: Modified Oswestry Low Back Pain Questionnaire  Score:  Initial: 10/50  Most Recent: 25/50 (Date: 5-10-18 )   Interpretation of Score: Each section is scored on a 0-5 scale, 5 representing the greatest disability. The scores of each section are added together for a total score of 50. Score 0 1-10 11-20 21-30 31-40 41-49 50   Modifier CH CI CJ CK CL CM CN       Medical Necessity:   · Patient is expected to demonstrate progress in strength, range of motion and symptom levels to return to full function. Reason for Services/Other Comments:  · Patient continues to require skilled intervention due to ongoing symptoms. TREATMENT:   (In addition to Assessment/Re-Assessment sessions the following treatments were rendered)    Subjective Pre-Treatment Symptoms: reports that her shoulder is doing better since last session. Neck and headaches are doing relatively good. Therapeutic Exercise: (15 min) Done in order to improve strength, ROM and understanding of current condition.     Date  5-1-18 Date  5-10-18   Activity/Exercise     Education Discussed HEP Discussed HEP   Sleeper stretch & cross arm stretch At 90 deg, 10x 10 sec, 5x   Thoracic rotation  -    Diaphragmatic breathing  HEP HEP   Scapula stabilization  · - · Supine forward elevation, with ER resistance, to nose level, maintain ER activation, yellow band, 10x, 2 sets  · W/ ER and IR manual  resistance    Self 1st rib inferior glide HEP    Low Y's - Yellow band, 10x, 2 sets   Pec stretch 30 sec, discussed proper form    Manual Therapy: (15min) Done in order to improve joint and soft tissue mobility,reduce muscle guarding, and decrease muscle tone  · Soft tissue mobilization to posterior deltoid, teres major,minor, lats and infraspinatus, used cupping tool   · 1st rib inferior glide MET  · L anterior and posterior capsule mobilization, grade IV, at 45-60 and 90 deg, followed by MET at end range followed by neuro muscular re-ed  · Inferior capsule glide, grade IV  Modalities: (- min) Done in order to reduce swelling and pain  -    Treatment/Session Assessment:  Patient tolerated the session well. She still has increased posterior GH tightness at 90 deg. Since her pain is mostly during periods of immobility, I suspect taht the pain is due to tightness. Discussed stopping the band exercises for several days to see if the pain decreases. Discussed her updated home program.     · Pain: Initial:  Reports more shoulder soreness over the last couple days Post Session:  Mild soreness reported. ·   Compliance with Program/Exercises: compliant   · Recommendations/Intent for next treatment session: \"Next visit will focus on progressing the patients plan of care\".     Future Appointments  Date Time Provider Poonam Zapata   5/24/2018 2:00 PM Ilsa Gil PT, DPT [facility]   5/29/2018 10:30 AM Ilsa Gil PT, DPT [facility]   6/7/2018 10:30 AM Ilsa Gil PT, DPT [facility]   8/21/2018 9:00 AM CMW ULTRASOUND SSA CMW CMW     Total Treatment Duration: 30 min  Re-eval 30 min  PT Patient Time In/Time Out  Time In: 0930  Time Out: [facility] PT, DPT

## 2018-05-11 ENCOUNTER — APPOINTMENT (OUTPATIENT)
Dept: PHYSICAL THERAPY | Age: 63
End: 2018-05-11
Payer: COMMERCIAL

## 2018-05-22 ENCOUNTER — APPOINTMENT (OUTPATIENT)
Dept: PHYSICAL THERAPY | Age: 63
End: 2018-05-22
Payer: COMMERCIAL

## 2018-05-24 ENCOUNTER — HOSPITAL ENCOUNTER (OUTPATIENT)
Dept: PHYSICAL THERAPY | Age: 63
Discharge: HOME | End: 2018-05-24
Payer: COMMERCIAL

## 2018-05-24 PROCEDURE — 97110 THERAPEUTIC EXERCISES: CPT

## 2018-05-24 PROCEDURE — 97140 MANUAL THERAPY 1/> REGIONS: CPT

## 2018-05-24 NOTE — PROGRESS NOTES
Eddie Garcia  : 1955  Primary: Vero Foley Ppo  Secondary:  Therapy Center at [address] [place] [address]  [phone]   [phone]         OUTPATIENT PHYSICAL THERAPY:Daily Note 2018    ICD-10: Treatment Diagnosis: PAIN IN THORACIC SPINE M54.6; CERVICALGIA M54.2; NEUROMUSCULAR SCOLIOSIS, CERVICOTHORACIC REGION M41.43;  M75.02    Precautions/Allergies: non reported  Fall Risk Score: 0 (? 5 = High Risk)  MEDICAL/REFERRING DIAGNOSIS:Thoracic back pain [M54.6]  DATE OF ONSET: chronic   REFERRING PHYSICIAN:Tran Walter MD    ASSESSMENT:  Ms. Stephany Marks has come for a total of 7 visits since starting physical therapy on 3-15-18. During that time she has progressed with ROM of her glenohumeral joint and is able to better manage her cervical symptoms with less headache frequency. However she still has increased pain at night in her shoulder and continued scapular stabilizer weakness. Skilled physical therapy is recommended to continue progressing her plan of care. Thank you. PROBLEM LIST (Impacting functional limitations):  1. Decreased Strength  2. Decreased ADL/Functional Activities  3. Increased Pain  4. Decreased Activity Tolerance  5. Decreased Flexibility/Joint Mobility  6. Decreased [place] with Home Exercise Program INTERVENTIONS PLANNED:  1. Cold  2. Heat  3. Home Exercise Program (HEP)  4. Manual Therapy  5. Range of Motion (ROM)  6. Therapeutic Exercise/Strengthening     TREATMENT PLAN:  Effective Dates: 5-10-18 TO 8-10-18  Frequency/Duration: 1x/ 2 wks for 90 days   GOALS: (Goals have been discussed and agreed upon with patient.)  SHORT-TERM FUNCTIONAL GOALS: Time Frame: 2-4 wks  1. Patient will report 25-50% reduction in overall symptoms (MET)  2. Patient will be compliant with HEP and plan of care (MET)  3. Patient will improve IR by 10 deg (MET)  4.   Patient will improve on the Oswestry by 3-5 points   DISCHARGE GOALS: Time Frame: 6-8 wk  1.  Patient will report % reduction in overall symptoms in order to be able to have full function with decreased symptoms (ongoing)  2. Patient will be able to hike 3-5 miles with minimal increase in symptoms  (MET)  3. Patient will be able to self manage her condition in order to continue living an active life style (ongoing)   4. Patient will improve on the Oswestry  by 8-10 points in order to demonstrate improved function with less pain (ongoing)  Rehabilitation Potential For Stated Goals: Good    Regarding Qian Benitesling therapy, I certify that the treatment plan above will be carried out by a therapist or under their direction. Jordan Monk PT,DPT              HISTORY:   History of Present Injury/Illness (Reason for Referral): Reports that she developed frozen shoulder on the left side for which she went to therapy while she was in [place]. She is feeling some better from the shoulder. Her neck and mid back have been okay but she has not been as active. Past Medical History/Comorbidities:   Past Medical History:   Diagnosis Date    Osteoarthritis of hand     Osteopenia      Social History/Living Environment: lives with  in a single family home  Prior Level of Function/Work/Activity:independent, retired   Current Medications:     Current Outpatient Prescriptions:     ESTRACE 0.01 % (0.1 mg/gram) vaginal cream, , Disp: , Rfl:     CALCIUM CARBONATE (CALCIUM 300 PO), Take 1 Tab by mouth daily. , Disp: , Rfl:     calcium 500 mg Tab, Take 1 Tab by mouth daily. , Disp: , Rfl:     Cholecalciferol, Vitamin D3, (VITAMIN D3) 1,000 unit cap, Take  by mouth., Disp: , Rfl:     ASCORBATE CALCIUM (VITAMIN C PO), Take 1 Tab by mouth daily. , Disp: , Rfl:     FLAXSEED OIL (OMEGA 3 PO), Take 1 Tab by mouth daily. , Disp: , Rfl:     MAGNESIUM PO, Take 1 Tab by mouth daily. , Disp: , Rfl:     SELENIUM PO, Take  by mouth., Disp: , Rfl:     OTHER, Vit k2 liquid once daily, Disp: , Rfl:   Date Last Reviewed:  5/28/2018   Number of Personal Factors/Comorbidities that affect the Plan of Care: 0: LOW COMPLEXITY   EXAMINATION:   (Abbreviations: P-pain, NP- no pain, wnl-within normal limits)  Observation/Orthostatic Postural Assessment:    Standing resting posture:  · Scoliotic curvature noted, convex L in thoracic, convex R TL junction region  · Rotated L in mid thoracic   · Flexed at T1-T2    Palpation/tone/tissue texture:    Soft tissue:  · Upper traps: mild tightness bilateral   · Levatore scapulae: mild tightness bilateral  · Sternocleidomastoid: n/a   · Scalenes:n/a  · Suboccipitals:n/a     PAIVM: (passive accessory intervertebral motion)  · C0-C1:n/a  · C1-C2:n/a  · Mid Cervical: wnl  · Lower Cervical: relatively wnl  · Upper thoracic: hypomobile  · Mid thoracic:hypomobile on L side    Breathing Assessment:  · Upper chest (pump handle): present with deeper breathing  · Mid thoracic (bucket handle): dominant   · Lower rib cage (caliper): minimal A-P motion  ·     ROM:  (P-pain  NP-no pain)  Cervical ROM  (tested in sitting) Date:  5-10-18       Flexion wnl   Extension wnl   Rotation L 75 deg   Rotation R 80 deg   Quadrant exten n/a   Quadrant flex n/a     Shoulder ROM Date:  5-10-18       Right Left   Flexion wnl End range limitation    Abduction wnl End range limitation   IR 75 deg 60 deg    deg 80 deg    Thoracic ROM  (tested in sitting) Date:  5-10-18       Flexion wnl   Extension Mild limitation in mid thoracic   Rotation L 50%   Rotation R 50%   SB R 50%, C curve noted   SB L 50%, C curve noted           Strength:   Shoulder strength Date:  5-10-18       Right Left   Flexion 5 5   Abduction 5 5   IR 5 5   ER 5 5        Scapular stability Date:  5-10-18       Right Left   Mid trap 4+ 4+   Low trap 4- 4-   Rhomboids 4 4     Cervical Strength Date:  5-10-18   Chin tuck: n/a             Special Tests: -  Neurological Screen:   Myotomes: strong in bilateral UE's         Dermatomes: intact in bilateral UE's         Reflexes: n/a         Neural Tension Tests: ULTT (median):   Functional Mobility:    Difficulty with behind the back motions of the arm  Balance:-   CLINICAL DECISION MAKING:   Outcome Measure: Tool Used: Modified Oswestry Low Back Pain Questionnaire  Score:  Initial: 10/50  Most Recent: 25/50 (Date: 5-10-18 )   Interpretation of Score: Each section is scored on a 0-5 scale, 5 representing the greatest disability. The scores of each section are added together for a total score of 50. Score 0 1-10 11-20 21-30 31-40 41-49 50   Modifier CH CI CJ CK CL CM CN       Medical Necessity:   · Patient is expected to demonstrate progress in strength, range of motion and symptom levels to return to full function. Reason for Services/Other Comments:  · Patient continues to require skilled intervention due to ongoing symptoms. TREATMENT:   (In addition to Assessment/Re-Assessment sessions the following treatments were rendered)    Subjective Pre-Treatment Symptoms: reports that she went on a trip to [place] to visit family. She did good while there but had pain the night they came back. Therapeutic Exercise: (25min) Done in order to improve strength, ROM and understanding of current condition.     Date  5-1-18 Date  5-10-18 Date  5-24-18   Activity/Exercise      Education Discussed HEP Discussed HEP Discussed HEP   Sleeper stretch & cross arm stretch At 90 deg, 10x 10 sec, 5x discussed   Thoracic rotation  -  -   Diaphragmatic breathing  HEP HEP    Scapula stabilization  · - · Supine forward elevation, with ER resistance, to nose level, maintain ER activation, yellow band, 10x, 2 sets  · W/ ER and IR manual  resistance  · Supine w/ ER resistance, 10x, 2 sets, yellow band   Self 1st rib inferior glide HEP  HEP   Low Y's - Yellow band, 10x, 2 sets HEP   Pec stretch 30 sec, discussed proper form  -   ER   While self stabilizing towards posterior glide, 10x, 3 sets   IR   While self stabilizing towards posterior glide, 10x, 3 sets   Manual Therapy: (30 min) Done in order to improve joint and soft tissue mobility,reduce muscle guarding, and decrease muscle tone  · Soft tissue mobilization to posterior deltoid, teres major,minor, lats and infraspinatus,  · 1st rib inferior glide MET  · L  posterior capsule mobilization, grade IV, at 45-60 and 90 deg, followed by MET at end range followed by neuro muscular re-ed  · Inferior capsule glide, grade IV  Modalities: (- min) Done in order to reduce swelling and pain  -    Treatment/Session Assessment:  Patient tolerated the session well. We worked primarily on stabilizing towards posterior glide with ER resistance. She was educated how to center her glenohumeral joint. · Pain: Initial:  Reports more shoulder soreness over the last couple days Post Session:  Mild soreness reported. ·   Compliance with Program/Exercises: compliant   · Recommendations/Intent for next treatment session: \"Next visit will focus on progressing the patients plan of care\".     Future Appointments  Date Time Provider Poonam Zapata   5/29/2018 10:30 AM Frankie Vitale, PT, DPT [facility]   6/7/2018 10:30 AM Frankie Vitale, PT, DPT [facility]   8/21/2018 9:00 AM CMW ULTRASOUND [facility]SA CMW CMW     Total Treatment Duration:55 min  PT Patient Time In/Time Out  Time In: 1400  Time Out: [address] [employer] PT, DPT

## 2018-05-29 ENCOUNTER — HOSPITAL ENCOUNTER (OUTPATIENT)
Dept: PHYSICAL THERAPY | Age: 63
Discharge: HOME | End: 2018-05-29
Payer: COMMERCIAL

## 2018-05-29 PROCEDURE — 97110 THERAPEUTIC EXERCISES: CPT

## 2018-05-29 PROCEDURE — 97140 MANUAL THERAPY 1/> REGIONS: CPT

## 2018-05-29 NOTE — PROGRESS NOTES
Deacon Ruano  : 1955  Primary: Claudio Jacobs Ppo  Secondary:  [facility] Center at [address] [place] [address]  [phone]   [phone]         OUTPATIENT PHYSICAL THERAPY:Daily Note 2018    ICD-10: Treatment Diagnosis: PAIN IN THORACIC SPINE M54.6; CERVICALGIA M54.2; NEUROMUSCULAR SCOLIOSIS, CERVICOTHORACIC REGION M41.43;  M75.02    Precautions/Allergies: non reported  Fall Risk Score: 0 (? 5 = High Risk)  MEDICAL/REFERRING DIAGNOSIS:Thoracic back pain [M54.6]  DATE OF ONSET: chronic   REFERRING PHYSICIAN:Salvatore Walter MD    ASSESSMENT:  Ms. Queen Fink has come for a total of 7 visits since starting physical therapy on 3-15-18. During that time she has progressed with ROM of her glenohumeral joint and is able to better manage her cervical symptoms with less headache frequency. However she still has increased pain at night in her shoulder and continued scapular stabilizer weakness. Skilled physical therapy is recommended to continue progressing her plan of care. Thank you. PROBLEM LIST (Impacting functional limitations):  1. Decreased Strength  2. Decreased ADL/Functional Activities  3. Increased Pain  4. Decreased Activity Tolerance  5. Decreased Flexibility/Joint Mobility  6. Decreased [place] with Home Exercise Program INTERVENTIONS PLANNED:  1. Cold  2. Heat  3. Home Exercise Program (HEP)  4. Manual Therapy  5. Range of Motion (ROM)  6. Therapeutic Exercise/Strengthening     TREATMENT PLAN:  Effective Dates: 5-10-18 TO 8-10-18  Frequency/Duration: 1x/ 2 wks for 90 days   GOALS: (Goals have been discussed and agreed upon with patient.)  SHORT-TERM FUNCTIONAL GOALS: Time Frame: 2-4 wks  1. Patient will report 25-50% reduction in overall symptoms (MET)  2. Patient will be compliant with HEP and plan of care (MET)  3. Patient will improve IR by 10 deg (MET)  4.   Patient will improve on the Oswestry by 3-5 points   DISCHARGE GOALS: Time Frame: 6-8 wk  1.  Patient will report % reduction in overall symptoms in order to be able to have full function with decreased symptoms (ongoing)  2. Patient will be able to hike 3-5 miles with minimal increase in symptoms  (MET)  3. Patient will be able to self manage her condition in order to continue living an active life style (ongoing)   4. Patient will improve on the Oswestry  by 8-10 points in order to demonstrate improved function with less pain (ongoing)  Rehabilitation Potential For Stated Goals: Good    Regarding Yvette Stroud therapy, I certify that the treatment plan above will be carried out by a therapist or under their direction. Paige Mota PT,DPT              HISTORY:   History of Present Injury/Illness (Reason for Referral): Reports that she developed frozen shoulder on the left side for which she went to therapy while she was in [place]. She is feeling some better from the shoulder. Her neck and mid back have been okay but she has not been as active. Past Medical History/Comorbidities:   Past Medical History:   Diagnosis Date    Osteoarthritis of hand     Osteopenia      Social History/Living Environment: lives with  in a single family home  Prior Level of Function/Work/Activity:independent, retired   Current Medications:     Current Outpatient Prescriptions:     ESTRACE 0.01 % (0.1 mg/gram) vaginal cream, , Disp: , Rfl:     CALCIUM CARBONATE (CALCIUM 300 PO), Take 1 Tab by mouth daily. , Disp: , Rfl:     calcium 500 mg Tab, Take 1 Tab by mouth daily. , Disp: , Rfl:     Cholecalciferol, Vitamin D3, (VITAMIN D3) 1,000 unit cap, Take  by mouth., Disp: , Rfl:     ASCORBATE CALCIUM (VITAMIN C PO), Take 1 Tab by mouth daily. , Disp: , Rfl:     FLAXSEED OIL (OMEGA 3 PO), Take 1 Tab by mouth daily. , Disp: , Rfl:     MAGNESIUM PO, Take 1 Tab by mouth daily. , Disp: , Rfl:     SELENIUM PO, Take  by mouth., Disp: , Rfl:     OTHER, Vit k2 liquid once daily, Disp: , Rfl:   Date Last Reviewed:  5/29/2018   Number of Personal Factors/Comorbidities that affect the Plan of Care: 0: LOW COMPLEXITY   EXAMINATION:   (Abbreviations: P-pain, NP- no pain, wnl-within normal limits)  Observation/Orthostatic Postural Assessment:    Standing resting posture:  · Scoliotic curvature noted, convex L in thoracic, convex R TL junction region  · Rotated L in mid thoracic   · Flexed at T1-T2    Palpation/tone/tissue texture:    Soft tissue:  · Upper traps: mild tightness bilateral   · Levatore scapulae: mild tightness bilateral  · Sternocleidomastoid: n/a   · Scalenes:n/a  · Suboccipitals:n/a     PAIVM: (passive accessory intervertebral motion)  · C0-C1:n/a  · C1-C2:n/a  · Mid Cervical: wnl  · Lower Cervical: relatively wnl  · Upper thoracic: hypomobile  · Mid thoracic:hypomobile on L side    Breathing Assessment:  · Upper chest (pump handle): present with deeper breathing  · Mid thoracic (bucket handle): dominant   · Lower rib cage (caliper): minimal A-P motion  ·     ROM:  (P-pain  NP-no pain)  Cervical ROM  (tested in sitting) Date:  5-10-18       Flexion wnl   Extension wnl   Rotation L 75 deg   Rotation R 80 deg   Quadrant exten n/a   Quadrant flex n/a     Shoulder ROM Date:  5-10-18       Right Left   Flexion wnl End range limitation    Abduction wnl End range limitation   IR 75 deg 60 deg    deg 80 deg    Thoracic ROM  (tested in sitting) Date:  5-10-18       Flexion wnl   Extension Mild limitation in mid thoracic   Rotation L 50%   Rotation R 50%   SB R 50%, C curve noted   SB L 50%, C curve noted           Strength:   Shoulder strength Date:  5-10-18       Right Left   Flexion 5 5   Abduction 5 5   IR 5 5   ER 5 5        Scapular stability Date:  5-10-18       Right Left   Mid trap 4+ 4+   Low trap 4- 4-   Rhomboids 4 4     Cervical Strength Date:  5-10-18   Chin tuck: n/a             Special Tests: -  Neurological Screen:   Myotomes: strong in bilateral UE's         Dermatomes: intact in bilateral UE's         Reflexes: n/a         Neural Tension Tests: ULTT (median):   Functional Mobility:    Difficulty with behind the back motions of the arm  Balance:-   CLINICAL DECISION MAKING:   Outcome Measure: Tool Used: Modified Oswestry Low Back Pain Questionnaire  Score:  Initial: 10/50  Most Recent: 25/50 (Date: 5-10-18 )   Interpretation of Score: Each section is scored on a 0-5 scale, 5 representing the greatest disability. The scores of each section are added together for a total score of 50. Score 0 1-10 11-20 21-30 31-40 41-49 50   Modifier CH CI CJ CK CL CM CN       Medical Necessity:   · Patient is expected to demonstrate progress in strength, range of motion and symptom levels to return to full function. Reason for Services/Other Comments:  · Patient continues to require skilled intervention due to ongoing symptoms. TREATMENT:   (In addition to Assessment/Re-Assessment sessions the following treatments were rendered)    Subjective Pre-Treatment Symptoms: reports that she is doing pretty good. She felt good after our last session and has been relatively good since with minor pain at times. Therapeutic Exercise: (25min) Done in order to improve strength, ROM and understanding of current condition.     Date  5-10-18 Date  5-24-18 Date   5-29-18   Activity/Exercise      Education Discussed HEP Discussed HEP Discussed HEP   Sleeper stretch & cross arm stretch 10 sec, 5x discussed HEP   Scapula stabilization  · Supine forward elevation, with ER resistance, to nose level, maintain ER activation, yellow band, 10x, 2 sets  · W/ ER and IR manual  resistance  · Supine w/ ER resistance, 10x, 2 sets, yellow band ·    Self 1st rib inferior glide  HEP HEP   Pec stretch  -    ER  While self stabilizing towards posterior glide, 10x, 3 sets Red band, 10x, worked on keeping glenohumeral joint centered, 2 sets, manual cues given   IR  While self stabilizing towards posterior glide, 10x, 3 sets Red band, 10x, worked on keeping glenohumeral joint centered, 2 sets, manual cues given   Forward elevation   In supine and sitting, yellow band resistance, feedback for posterior glide, 10x, 2 sets   Manual Therapy: (20 min) Done in order to improve joint and soft tissue mobility,reduce muscle guarding, and decrease muscle tone  · Soft tissue mobilization to posterior deltoid, teres major,minor, lats and infraspinatus,  · 1st rib inferior glide MET (NOT DONE TODAY)  · L  posterior capsule mobilization, grade IV, at 45-60 and 90 deg, followed by MET at end range followed by neuro muscular re-ed  · Inferior capsule glide, grade IV  Modalities: (- min) Done in order to reduce swelling and pain  -    Treatment/Session Assessment:  Patient tolerated the session well. Her motion is improving and we are trying to get her to actively be able to centralized her humerus in the glenoid fossa which she is demonstrating good neuromuscular control with. Discussed her updated HEP. · Pain: Initial:  Mild soreness reported Post Session:  Mild soreness reported. ·   Compliance with Program/Exercises: compliant   · Recommendations/Intent for next treatment session: \"Next visit will focus on progressing the patients plan of care\".     Future Appointments  Date Time Provider Poonam Zapata   6/7/2018 10:30 AM Win Correa, PT, DPT [facility]   8/21/2018 9:00 AM CMW ULTRASOUND [facility] CMW CMW     Total Treatment Duration:50 min  PT Patient Time In/Time Out  Time In: 0425  Time Out: Sean 62 PT, DPT

## 2018-06-07 ENCOUNTER — HOSPITAL ENCOUNTER (OUTPATIENT)
Dept: PHYSICAL THERAPY | Age: 63
Discharge: HOME | End: 2018-06-07
Payer: COMMERCIAL

## 2018-06-07 PROCEDURE — 97110 THERAPEUTIC EXERCISES: CPT

## 2018-06-07 PROCEDURE — 97140 MANUAL THERAPY 1/> REGIONS: CPT

## 2018-06-07 NOTE — PROGRESS NOTES
Sheilance Fus  : 1955  Primary: Omar Motley Ppo  Secondary:  Therapy Center at [address] [place] [address]  [phone]   [phone]         OUTPATIENT PHYSICAL THERAPY:Daily Note and Discharge 2018    ICD-10: Treatment Diagnosis: PAIN IN THORACIC SPINE M54.6; CERVICALGIA M54.2; NEUROMUSCULAR SCOLIOSIS, CERVICOTHORACIC REGION M41.43;  M75.02    Precautions/Allergies: non reported  Fall Risk Score: 0 (? 5 = High Risk)  MEDICAL/REFERRING DIAGNOSIS:Thoracic back pain [M54.6]  DATE OF ONSET: chronic   REFERRING PHYSICIAN:Dennys Walter MD    ASSESSMENT:  Ms. Donna Oropeza has come for a total of  10 visits since starting physical therapy on 3-15-18. During that time she reports about 75% overall improvement and displays objective improvements in ROM and strength. At this time she is ready to be discharged with an updated home exercise program. Thank you. GOALS: (Goals have been discussed and agreed upon with patient.)  SHORT-TERM FUNCTIONAL GOALS: Time Frame: 2-4 wks  1. Patient will report 25-50% reduction in overall symptoms (MET)  2. Patient will be compliant with HEP and plan of care (MET)  3. Patient will improve IR by 10 deg (MET)  4. Patient will improve on the Oswestry by 3-5 points   DISCHARGE GOALS: Time Frame: 6-8 wk  1. Patient will report % reduction in overall symptoms in order to be able to have full function with decreased symptoms (MET)  2. Patient will be able to hike 3-5 miles with minimal increase in symptoms  (MET)  3. Patient will be able to self manage her condition in order to continue living an active life style (MET)   4. Patient will improve on the Oswestry  by 8-10 points in order to demonstrate improved function with less pain (MET)    Regarding Florance Fus therapy, I certify that the treatment plan above will be carried out by a therapist or under their direction.       Armando Sanders PT,DPT              HISTORY: History of Present Injury/Illness (Reason for Referral): Reports that she developed frozen shoulder on the left side for which she went to therapy while she was in [place]. She is feeling some better from the shoulder. Her neck and mid back have been okay but she has not been as active. Past Medical History/Comorbidities:   Past Medical History:   Diagnosis Date    Osteoarthritis of hand     Osteopenia      Social History/Living Environment: lives with  in a single family home  Prior Level of Function/Work/Activity:independent, retired   Current Medications:     Current Outpatient Prescriptions:     ESTRACE 0.01 % (0.1 mg/gram) vaginal cream, , Disp: , Rfl:     CALCIUM CARBONATE (CALCIUM 300 PO), Take 1 Tab by mouth daily. , Disp: , Rfl:     calcium 500 mg Tab, Take 1 Tab by mouth daily. , Disp: , Rfl:     Cholecalciferol, Vitamin D3, (VITAMIN D3) 1,000 unit cap, Take  by mouth., Disp: , Rfl:     ASCORBATE CALCIUM (VITAMIN C PO), Take 1 Tab by mouth daily. , Disp: , Rfl:     FLAXSEED OIL (OMEGA 3 PO), Take 1 Tab by mouth daily. , Disp: , Rfl:     MAGNESIUM PO, Take 1 Tab by mouth daily. , Disp: , Rfl:     SELENIUM PO, Take  by mouth., Disp: , Rfl:     OTHER, Vit k2 liquid once daily, Disp: , Rfl:   Date Last Reviewed:  6/7/2018   EXAMINATION:   (Abbreviations: P-pain, NP- no pain, wnl-within normal limits)  Observation/Orthostatic Postural Assessment:    Standing resting posture:  · Scoliotic curvature noted, convex L in thoracic, convex R TL junction region  · Rotated L in mid thoracic   · Flexed at T1-T2    Palpation/tone/tissue texture:    Soft tissue:  · Upper traps: mild tightness bilateral   · Levatore scapulae: mild tightness bilateral  · Sternocleidomastoid: n/a   · Scalenes:n/a  · Suboccipitals:n/a     PAIVM: (passive accessory intervertebral motion)  · C0-C1:n/a  · C1-C2:n/a  · Mid Cervical: wnl  · Lower Cervical: relatively wnl  · Upper thoracic: hypomobile  · Mid thoracic:hypomobile on L side    Breathing Assessment:  · Upper chest (pump handle): present with deeper breathing  · Mid thoracic (bucket handle): dominant   · Lower rib cage (caliper): minimal A-P motion  ·     ROM:  (P-pain  NP-no pain)  Cervical ROM  (tested in sitting) Date:  6-7-18       Flexion wnl   Extension wnl   Rotation L 75 deg   Rotation R 80 deg   Quadrant exten n/a   Quadrant flex n/a     Shoulder ROM Date:  6-7-18       Right Left   Flexion wnl Mild end range limitation    Abduction wnl Mild end range limitation    IR 70 deg 65 deg    deg 90 deg    Thoracic ROM  (tested in sitting) Date:  6-7-18       Flexion wnl   Extension Mild limitation in mid thoracic   Rotation L 50%   Rotation R 50%   SB R 50%, C curve noted   SB L 50%, C curve noted           Strength:   Shoulder strength Date:  6-7-18       Right Left   Flexion 5 5   Abduction 5 5   IR 5 5   ER 5 5        Scapular stability Date:  6-7-18       Right Left   Mid trap 4+ 4+   Low trap 4 4   Rhomboids 4 4           Special Tests: -  Neurological Screen:   Myotomes: strong in bilateral UE's         Dermatomes: intact in bilateral UE's         Reflexes: n/a         Neural Tension Tests: ULTT (median):   Functional Mobility:    Difficulty with behind the back motions of the arm  Balance:-   CLINICAL DECISION MAKING:   Outcome Measure: Tool Used: Modified Oswestry Low Back Pain Questionnaire  Score:  Initial: 10/50   25/50 (Date: 5-10-18 ) Most Recent: 9/50  Date 6-7-10   Interpretation of Score: Each section is scored on a 0-5 scale, 5 representing the greatest disability. The scores of each section are added together for a total score of 50. Score 0 1-10 11-20 21-30 31-40 41-49 50   Modifier CH CI CJ CK CL CM CN      TREATMENT:   (In addition to Assessment/Re-Assessment sessions the following treatments were rendered)    Subjective Pre-Treatment Symptoms: reports that she is doing pretty good. States she alysha that the tape still made her shoulder achy. Overall 75% improvement reported. Therapeutic Exercise: (40 min) Done in order to improve strength, ROM and understanding of current condition. Date  5-24-18 Date   5-29-18 Date  6-7-18   Activity/Exercise      Education Discussed HEP Discussed HEP Discussed updated HEP in detail   Sleeper stretch & cross arm stretch discussed HEP    Scapula stabilization  · Supine w/ ER resistance, 10x, 2 sets, yellow band ·  Side-lying, 10x, 2 sets, manual cues for posterior glide   Self 1st rib inferior glide HEP HEP discussed   Pec stretch -  discussed   ER While self stabilizing towards posterior glide, 10x, 3 sets Red band, 10x, worked on keeping glenohumeral joint centered, 2 sets, manual cues given Red band, 10x, 2 sets,    IR While self stabilizing towards posterior glide, 10x, 3 sets Red band, 10x, worked on keeping glenohumeral joint centered, 2 sets, manual cues given Red band, 10x, worked on keeping glenohumeral joint centered, 2 sets, manual cues given   Forward elevation  In supine and sitting, yellow band resistance, feedback for posterior glide, 10x, 2 sets In supine and sitting, yellow band resistance, feedback for posterior glide, 10x, 2 sets   Manual Therapy: (15 min) Done in order to improve joint and soft tissue mobility,reduce muscle guarding, and decrease muscle tone  · Soft tissue mobilization to posterior deltoid, teres major,minor, lats and infraspinatus,  · 1st rib inferior glide MET (NOT DONE TODAY)  · L  posterior capsule mobilization, grade IV, at 45-60 and 90 deg, followed by MET at end range followed by neuro muscular re-ed  · Inferior capsule glide, grade IV  Modalities: (- min) Done in order to reduce swelling and pain  -    Treatment/Session Assessment:  Patient tolerated the session well. Her motion has improved and quality of rotator cuff control.  She was encouraged to continue progressing with her home program.     · Pain: Initial:  No pain at rest Post Session:  No pain at rest   Total Treatment Duration:55 min  Re-eval 5 min  PT Patient Time In/Time Out  Time In: 0945  Time Out: 1630 Dermal Life PT, DPT

## 2019-06-06 ENCOUNTER — HOSPITAL ENCOUNTER (OUTPATIENT)
Dept: PHYSICAL THERAPY | Age: 64
Discharge: HOME | End: 2019-06-06
Payer: COMMERCIAL

## 2019-06-06 PROCEDURE — 97140 MANUAL THERAPY 1/> REGIONS: CPT

## 2019-06-06 PROCEDURE — 97161 PT EVAL LOW COMPLEX 20 MIN: CPT

## 2019-06-06 NOTE — PROGRESS NOTES
Bailee Herbert  : 1955  Primary: [place] Ppo  Secondary:  [address]  at [facility] [address]  [phone]   [phone]        OUTPATIENT PHYSICAL THERAPY: Daily Treatment Note  2019      ICD-10: Treatment Diagnosis: LATERAL EPICONDYLITIS M77.12     Precautions/Allergies: non reported   Fall Risk Score: 0 (? 5 = High Risk)  TREATMENT PLAN:  Effective Dates: 2019 TO 19. Frequency/Duration: 1 time a week for 30 Day(s) MEDICAL/REFERRING DIAGNOSIS:Left elbow pain [M25.522]  DATE OF ONSET: 2019  REFERRING PHYSICIAN:Referred, Self, MD                Pre-treatment Symptoms/Complaints:  Reports chronic left elbow pain. Pain: Initial:   4/10 Post Session:  4/10   Medications Last Reviewed:  2019  Updated Objective Findings:     TREATMENT:   Therapeutic Exercise: (5 min) (Done in order to improve mobility, strength, function and overall understanding of condition)   Date:  19   Activity/Exercise Parameters   Education Discussed HEP   Self soft tissue management  discussed         Manual Therapy: (15 min) (Done to improve mobility, reduce tone, guarding and pain)  ·  soft tissue mobilization to extensors of left forearm    Modalities: (-)  Med[employer] Portal  Treatment/Session Summary:    · Response to Treatment: Ms. Tami Feldman tolerated the session well. Discussed her HEP and plan of care. · Communication/Consultation:  None today  · Equipment provided today:  None today  · Recommendations/Intent for next treatment session: Next visit will focus on progressing established plan of care.   Treatment Plan of Care Effective Dates:  19 to 19  Total Treatment Billable Duration:  20 min, evaluation 40 min    PT Patient Time In/Time Out  Time In: 1300  Time Out: [facility], DPT

## 2019-06-06 NOTE — THERAPY EVALUATION
Ashley Dobbins  : 1955  Primary: Tatum Hernandezling Ppo  Secondary:  [facility]rapy Center at [address] [place] [address]  [phone]   [phone]         OUTPATIENT PHYSICAL THERAPY:Initial Assessment 2019    ICD-10: Treatment Diagnosis: LATERAL EPICONDYLITIS M77.12    Precautions/Allergies: non reported   Fall Risk Score: 0 (? 5 = High Risk)  TREATMENT PLAN:  Effective Dates: 2019 TO 19. Frequency/Duration: 1 time a week for 30 Day(s) MEDICAL/REFERRING DIAGNOSIS:Left elbow pain [M25.522]  DATE OF ONSET: 2019  REFERRING PHYSICIAN:Referred, Self, MD        ASSESSMENT:  Ms. Zayra Jean presents to physical therapy with symptoms of lateral elbow pain since 2019 . The examination reveals moderate tenderness at the lateral epicondyle and surrounding soft tissue . The examination is of low complexity due to a stable clinical presentation. Skilled physical therapy is recommended to progress the plan of care in order for the patient to return to full function with minimal symptoms. PROBLEM LIST (Impacting functional limitations):  1. Pain with wrist extension and supination  2. Moderate tenderness and soft tissue tightness at the lateral epicondyle   3. Unable to perform functional tasks with left arm due to increased pain INTERVENTIONS PLANNED:  1. Home Exercise Program (HEP)  2. Manual Therapy  3. Therapeutic Exercise/Strengthening  4. Modalities including ultrasound as needed      GOALS: (Goals have been discussed and agreed upon with patient.)  SHORT-TERM FUNCTIONAL GOALS: Time Frame: 2-4 wks  1. Patient will report 25-50% reduction in overall symptoms  2. Patient will be compliant with HEP and plan of care  3. Patient will be able to start a loading program without increase in pain of the forearm extensors   4. Patient will improve on the DASH by 3-5 points  DISCHARGE GOALS: Time Frame: 8-12 wk  1.   Patient will report % reduction in overall symptoms in order to be able to have full function with decreased symptoms  2. Patient will report feeling comfortable progressing their own plan of care with the home program prescribed  3. Patient will report being able to perform household ADL's without increased pain   4. Patient will improve on the DASH by 8-10 points in order to demonstrate improved function with less pain    Rehabilitation Potential For Stated Goals: [facility] therapy, I certify that the treatment plan above will be carried out by a therapist or under their direction. Thank you for this referral,  Shira Davey PT,DPT              HISTORY:   History of Present Injury/Illness (Reason for Referral): reports that she started having left lateral elbow pain after playing pickle ball in February 2019. No tingling or numbness in the arm. Neck and mid back pain are maintianing okay. Feels like she has not ever fully recovered from the  frozen shoulder. States her goal is to return to full activity without pain  Past Medical History/Comorbidities:   Past Medical History:   Diagnosis Date    Osteoarthritis of hand     Osteopenia        R radial fracture with surgical fixation       Social History/Living Environment: enjoys hiking and pickle ball  Prior Level of Function/Work/Activity: independent/ retired   Current Medications:     None reported   Date Last Reviewed: 6/6/2019     Ambulatory/Rehab Services H2 Model Falls Risk Assessment    Risk Factors:       No Risk Factors Identified Ability to Rise from Chair:       (0)  Ability to rise in a single movement    Falls Prevention Plan:       No modifications necessary   Total: (5 or greater = High Risk): 0    ©2010 [facility] [employer]. All Rights Reserved. [facility] States Patent #9,866,840.  Federal Law prohibits the replication, distribution or use without written permission from [employer]      Number of Personal Factors/Comorbidities that affect the Plan of Care: 0: LOW COMPLEXITY   EXAMINATION:   (Abbreviations: P-pain, NP- no pain, wnl-within normal limits)  Observation/Orthostatic Postural Assessment:    Standing resting posture:  · Mild forward head posture, moderate scapular winging bilateral     Palpation/tone/tissue texture:    Soft tissue:  · Upper traps: mild tightness bilateral   · Levatore scapulae:mild tightness bilateral   · Forearm extensors: tenderness at common tendon and brachioradialis and extensor carpi radialis brevis   PAIVM: (passive accessory intervertebral motion)  · Radial head mobility: relatively wnl       ROM:  (P-pain  NP-no pain)  Cervical ROM  (tested in sitting) Date:  6-6-19       Flexion wnl   Extension wnl   Rotation L wnl   Rotation R wnl   Quadrant exten n/a   Quadrant flex n/a     Shoulder ROM Date:  6-6-19       Right Left   Flexion wnl wnl   Abduction wnl wnl   IR wnl wnl   ER wnl wnl    Thoracic ROM  (tested in sitting) Date:  6-6-19       Flexion n/a   Extension Limited    Rotation L 75%   Rotation R 75%     Elbow ROM  (tested in sitting) Date:  6-6-19       Flexion wnl   Extension wnl   supination wnl   Pronation wnl           Strength:   Shoulder strength Date:  6-6-19       Right Left   Flexion 5 4   Abduction 5 4   IR 5 4   ER 5 4-     wrist strength Date:  6-6-19       Right Left   Flexion 5 5   Extension 5 4+, pain   Pronation  5 5   Supination  5 4+, pain      Scapular stability Date:  6-6-19       Right Left   Mid trap 4 4-   Low trap 4 4-   Rhomboids 4 4     Cervical Strength Date:  6-6-19   Chin tuck: n/a             Special Tests:     Neurological Screen:   Myotomes: strong in bilateral UE's         Dermatomes: intact in bilateral UE''s         Reflexes: n/a         Neural Tension Tests: ULTT (median): radial (-)  Functional Mobility:    diffculty with use of left arm especially anything involving wrist extension or supination   Balance:-     Body Structures Involved:  1. Joints  2. Muscles  3.  Ligaments Body Functions Affected:  1. Sensory/Pain  2. Neuromusculoskeletal  3. Movement Related Activities and Participation Affected:  1. General Tasks and Demands  2. Mobility  3. Domestic Life  4. Community, Social and [place]   Number of elements that affect the Plan of Care: 4+: HIGH COMPLEXITY   CLINICAL PRESENTATION:   Presentation: Stable and uncomplicated: LOW COMPLEXITY   CLINICAL DECISION MAKING:   Outcome Measure: Tool Used: Disabilities of the Arm, Shoulder and Hand (DASH) Questionnaire - Quick Version  Score:  Initial: 30/55  Most Recent: X/55 (Date: -- )   Interpretation of Score: The DASH is designed to measure the activities of daily living in person's with upper extremity dysfunction or pain. Each section is scored on a 1-5 scale, 5 representing the greatest disability. The scores of each section are added together for a total score of 55. Medical Necessity:   · Patient is expected to demonstrate progress in strength, range of motion and symptom levels to return to full function. Reason for Services/Other Comments:  · Patient continues to require skilled intervention due to ongoing symptoms. Use of outcome tool(s) and clinical judgement create a POC that gives a: Questionable prediction of patient's progress: MODERATE COMPLEXITY       · Pain: Initial:    8/10 at worst, 4/10 at rest Post Session:  4/10 ·   Compliance with Program/Exercises: Will assess as treatment progresses. · Recommendations/Intent for next treatment session: \"Next visit will focus on progressing the patients plan of care\". No future appointments.   Total Treatment Duration:  PT Patient Time In/Time Out  Time In: 1130  Time Out: [address] PT, DPT

## 2019-06-11 ENCOUNTER — APPOINTMENT (OUTPATIENT)
Dept: PHYSICAL THERAPY | Age: 64
End: 2019-06-11
Payer: COMMERCIAL

## 2019-06-25 ENCOUNTER — HOSPITAL ENCOUNTER (OUTPATIENT)
Dept: PHYSICAL THERAPY | Age: 64
Discharge: HOME | End: 2019-06-25
Payer: COMMERCIAL

## 2019-06-25 PROCEDURE — 97110 THERAPEUTIC EXERCISES: CPT

## 2019-06-25 PROCEDURE — 97140 MANUAL THERAPY 1/> REGIONS: CPT

## 2019-06-25 NOTE — PROGRESS NOTES
Mirna Gill  : 1955  Primary: Oscar Casper Ppo  Secondary:  [address] at [address]  [phone]   [phone]        OUTPATIENT PHYSICAL THERAPY: Daily Treatment Note  2019      ICD-10: Treatment Diagnosis: LATERAL EPICONDYLITIS M77.12     Precautions/Allergies: non reported   Fall Risk Score: 0 (? 5 = High Risk)  TREATMENT PLAN:  Effective Dates: 2019 TO 19. Frequency/Duration: 1 time a week for 30 Day(s) MEDICAL/REFERRING DIAGNOSIS:Left elbow pain [M25.522]  DATE OF ONSET: 2019  REFERRING PHYSICIAN:Referred, Self, MD                Pre-treatment Symptoms/Complaints:  Reports she is doing some better with tissue tenderness but still has pain with use of the arm  Pain: Initial:   No pain at rest Post Session: no pain at rest   Medications Last Reviewed:  2019  Updated Objective Findings:     TREATMENT:   Therapeutic Exercise: (40 min) (Done in order to improve mobility, strength, function and overall understanding of condition)   Date:  19 Date  19   Activity/Exercise Parameters    Education Discussed HEP Discussed HEP     Self soft tissue mobilization Discussed Reviewed    Wrist extension  10x, 2#, 2 sets   Wrist supination   10x,2 sets, 2#   Prone Y  10x, bilateral    Thoracic rotation/ arm circles   10x, bilateral, focused on costal cage mobility bilateral    90/90 shoulder ER  10x, yellow band     Manual Therapy: (20min) (Done to improve mobility, reduce tone, guarding and pain)  ·  soft tissue mobilization to extensors of left forearm, done with active wrist flexion and pronation     Modalities: (-)  MedBridge Portal  Treatment/Session Summary:    · Response to Treatment: Ms. Zully Aponte tolerated the session well. Her forearm soft tissue mobility has improved and she is less tender. We were able to start the tendon remodeling/strengthening stage which she tolerated well.  Also started working on scapular and rotator cuff stability to reduce compensations with pickle ball and tennis. · Communication/Consultation:  None today  · Equipment provided today:  None today  · Recommendations/Intent for next treatment session: Next visit will focus on progressing established plan of care.   Treatment Plan of Care Effective Dates:  6-6-19 to 9-6-19  Total Treatment Billable Duration:  60 min    PT Patient Time In/Time Out  Time In: 1400  Time Out: 1500    Amada Cole PT, DPT

## 2019-07-09 ENCOUNTER — APPOINTMENT (OUTPATIENT)
Dept: PHYSICAL THERAPY | Age: 64
End: 2019-07-09
Payer: COMMERCIAL

## 2019-07-10 PROCEDURE — 88305 TISSUE EXAM BY PATHOLOGIST: CPT

## 2019-07-11 ENCOUNTER — HOSPITAL ENCOUNTER (OUTPATIENT)
Dept: PHYSICAL THERAPY | Age: 64
Discharge: HOME | End: 2019-07-11
Payer: COMMERCIAL

## 2019-07-11 ENCOUNTER — HOSPITAL ENCOUNTER (OUTPATIENT)
Dept: LAB | Age: 64
Discharge: HOME | End: 2019-07-11

## 2019-07-11 PROCEDURE — 97140 MANUAL THERAPY 1/> REGIONS: CPT

## 2019-07-11 NOTE — PROGRESS NOTES
Anton Patterson  : 1955  Primary: Kaia Spann Ppo  Secondary:  [address]  at [facility]  [phone]   [phone]        OUTPATIENT PHYSICAL THERAPY: Daily Treatment Note  2019      ICD-10: Treatment Diagnosis: LATERAL EPICONDYLITIS M77.12     Precautions/Allergies: non reported   Fall Risk Score: 0 (? 5 = High Risk)  TREATMENT PLAN:  Effective Dates: 2019 TO 19. Frequency/Duration: 1 time a week for 30 Day(s) MEDICAL/REFERRING DIAGNOSIS:Left elbow pain [M25.522]  DATE OF ONSET: 2019  REFERRING PHYSICIAN:Referred, Self, MD                Pre-treatment Symptoms/Complaints:  Reports she still has irritation in the elbow especially when she has not move it in a while.   Pain: Initial:   No pain at rest Post Session: no pain at rest   Medications Last Reviewed:  2019  Updated Objective Findings:  BP: 85/55 in supine and sitting   TREATMENT:   Therapeutic Exercise: (5min) (Done in order to improve mobility, strength, function and overall understanding of condition)   Date  19 Date  19   Activity/Exercise     Education Discussed HEP   · Discussed HEP  · Discussed importance of wearing the wrist brace to limit wrist extension     Self soft tissue mobilization Reviewed  discussed   Wrist extension 10x, 2#, 2 sets Gentle isometric, 3-5 sec hold, 10x, elbow bent   Wrist supination  10x,2 sets, 2# Stopped for now   Prone Y 10x, bilateral  -   Thoracic rotation/ arm circles  10x, bilateral, focused on costal cage mobility bilateral  HEP   90/90 shoulder ER 10x, yellow band HEP     Manual Therapy: (45min) (Done to improve mobility, reduce tone, guarding and pain)  · Soft tissue mobilization to extensors of left forearm, done with active wrist flexion and pronation  · Instrument assisted soft tissue mobilization to ECRB,lateral epicondyle and tendon    Modalities: (-)  MedBridge Portal  Treatment/Session Summary: · Response to Treatment: Ms. Rei Zelaya tolerated the session well. She had very little pain with wrist extension and supination after the manual treatment. Discussed her home program to include self soft tissue mobilization but only to load it with isometrics. She also felt the feeling of faint today because she had a colonoscopy yesterday. She was lied down, given water to drink and she reported feeling better; however, her BP was still 85/55. She reported that she felt fine to drive and would drink more fluids when she got home. · Communication/Consultation:  None today  · Equipment provided today:  None today  · Recommendations/Intent for next treatment session: Next visit will focus on progressing established plan of care.   Treatment Plan of Care Effective Dates:  6-6-19 to 9-6-19  Total Treatment Billable Duration:  50 min    PT Patient Time In/Time Out  Time In: 0835  Time Out: 0925    Vipul Anand, PT, DPT

## 2019-07-18 ENCOUNTER — HOSPITAL ENCOUNTER (OUTPATIENT)
Dept: PHYSICAL THERAPY | Age: 64
Discharge: HOME | End: 2019-07-18
Payer: COMMERCIAL

## 2019-07-18 PROCEDURE — 97140 MANUAL THERAPY 1/> REGIONS: CPT

## 2019-07-18 PROCEDURE — 97110 THERAPEUTIC EXERCISES: CPT

## 2019-07-18 NOTE — PROGRESS NOTES
Michael Narrow  : 1955  Primary: Sandra Aldrich Ppo  Secondary:  [address]  at [address] [facility], [address] [place] [address]  [phone]   [phone]        OUTPATIENT PHYSICAL THERAPY: Daily Treatment Note  2019      ICD-10: Treatment Diagnosis: LATERAL EPICONDYLITIS M77.12     Precautions/Allergies: non reported   Fall Risk Score: 0 (? 5 = High Risk)  TREATMENT PLAN:  Effective Dates: 2019 TO 19. Frequency/Duration: 1 time a week for 30 Day(s) MEDICAL/REFERRING DIAGNOSIS:Left elbow pain [M25.522]  DATE OF ONSET: 2019  REFERRING PHYSICIAN:Yobani Herron MD                Pre-treatment Symptoms/Complaints:  Reports that she feels better with the brace. States the pain is still there but better overall.   Pain: Initial:   No pain at rest Post Session: no pain at rest   Medications Last Reviewed:  2019  Updated Objective Findings:     TREATMENT:   Therapeutic Exercise: (15min) (Done in order to improve mobility, strength, function and overall understanding of condition)   Date  19 Date  19 Date  19   Activity/Exercise      Education Discussed HEP   · Discussed HEP  · Discussed importance of wearing the wrist brace to limit wrist extension   Discussed HEP   Self soft tissue mobilization Reviewed  discussed Reviewed    Wrist extension 10x, 2#, 2 sets Gentle isometric, 3-5 sec hold, 10x, elbow bent 1lb, elbow bent, from neutral, 10x, 3 sets   Wrist supination  10x,2 sets, 2# Stopped for now -   Prone Y 10x, bilateral  -    Thoracic rotation/ arm circles  10x, bilateral, focused on costal cage mobility bilateral  HEP HEP   90/90 shoulder ER 10x, yellow band HEP HEP     Manual Therapy: (30min) (Done to improve mobility, reduce tone, guarding and pain)  · Soft tissue mobilization to extensors of left forearm, done with active wrist flexion and pronation  · Instrument assisted soft tissue mobilization to ECRB,lateral epicondyle and tendon    Modalities: (-)  MedBridge Portal  Treatment/Session Summary:    · Response to Treatment: Ms. Yanez Postal tolerated the session well. She was able to tolerate 1lb wrist extension without increased pain but did report slight soreness after the session. We will follow up after she returns from her trip in 3 wks. · Communication/Consultation:  None today  · Equipment provided today:  None today  · Recommendations/Intent for next treatment session: Next visit will focus on progressing established plan of care.   Treatment Plan of Care Effective Dates:  6-6-19 to 9-6-19  Total Treatment Billable Duration:  45 min    PT Patient Time In/Time Out  Time In: 0900  Time Out: 0945    Mckenna Reyes, PT, DPT

## 2019-08-12 ENCOUNTER — HOSPITAL ENCOUNTER (OUTPATIENT)
Dept: PHYSICAL THERAPY | Age: 64
Discharge: HOME | End: 2019-08-12
Payer: COMMERCIAL

## 2019-08-12 PROCEDURE — 97140 MANUAL THERAPY 1/> REGIONS: CPT

## 2019-08-12 PROCEDURE — 97110 THERAPEUTIC EXERCISES: CPT

## 2019-08-12 NOTE — PROGRESS NOTES
Loraine Rdz  : 1955  Primary: [employer] Ppo  Secondary:  [address]  at [facility]  [phone]   [phone]        OUTPATIENT PHYSICAL THERAPY: Daily Treatment Note  2019      ICD-10: Treatment Diagnosis: LATERAL EPICONDYLITIS M77.12     Precautions/Allergies: non reported   Fall Risk Score: 0 (? 5 = High Risk)  TREATMENT PLAN:  Effective Dates: 2019 TO 19. Frequency/Duration: 1 time a week for 30 Day(s) MEDICAL/REFERRING DIAGNOSIS:Left elbow pain [M25.522]  DATE OF ONSET: 2019  REFERRING PHYSICIAN:Referred, Self, MD                Pre-treatment Symptoms/Complaints:  Reports that she is doing pretty good. States she went on many hikes while one her trip out [address] and did pretty good. Minor pain with certain movements. Pain: Initial:   No pain at rest Post Session: no pain at rest   Medications Last Reviewed:  2019  Updated Objective Findings:     TREATMENT:   Therapeutic Exercise: (15min) (Done in order to improve mobility, strength, function and overall understanding of condition)   Date  19 Date  19   Activity/Exercise     Education Discussed HEP Discussed HEP   Self soft tissue mobilization Reviewed     Wrist extension 1lb, elbow bent, from neutral, 10x, 3 sets 2 lb, 15x, 3 sets, elbow bent, off pillow   Wrist supination  - 2lb, partial motion, 10x, 2 sets   Prone Y  -   Thoracic rotation/ arm circles  HEP HEP   90/90 shoulder ER HEP HEP     Manual Therapy: (30min) (Done to improve mobility, reduce tone, guarding and pain)  · Soft tissue mobilization to extensors of left forearm, done with active wrist flexion and pronation  · Instrument assisted soft tissue mobilization to ECRB,lateral epicondyle and tendon    Modalities: (-)  MedBridge Portal  Treatment/Session Summary:    · Response to Treatment: Ms. Gee Grant tolerated the session well.  She is continuing to tolerate increased load on the tendon which is a good sign. Discussed her updated HEP  · Communication/Consultation:  None today   · Equipment provided today:  None today  · Recommendations/Intent for next treatment session: Next visit will focus on progressing established plan of care.   Treatment Plan of Care Effective Dates:  6-6-19 to 9-6-19  Total Treatment Billable Duration:  45 min    PT Patient Time In/Time Out  Time In: 1115  Time Out: 1200    Michelle Schultz, PT, DPT

## 2019-08-19 ENCOUNTER — HOSPITAL ENCOUNTER (OUTPATIENT)
Dept: PHYSICAL THERAPY | Age: 64
Discharge: HOME | End: 2019-08-19
Payer: COMMERCIAL

## 2019-08-19 PROCEDURE — 97110 THERAPEUTIC EXERCISES: CPT

## 2019-08-19 PROCEDURE — 97140 MANUAL THERAPY 1/> REGIONS: CPT

## 2019-08-19 NOTE — PROGRESS NOTES
Yesi Magdiel  : 1955  Primary: Seb Henson Ppo  Secondary:  [address] at [address]  [phone]   [phone]        OUTPATIENT PHYSICAL THERAPY: Daily Treatment Note  2019      ICD-10: Treatment Diagnosis: LATERAL EPICONDYLITIS M77.12     Precautions/Allergies: non reported   Fall Risk Score: 0 (? 5 = High Risk)  TREATMENT PLAN:  Effective Dates: 2019 TO 19. Frequency/Duration: 1 time a week for 30 Day(s) MEDICAL/REFERRING DIAGNOSIS:Left elbow pain [M25.522]  DATE OF ONSET: 2019  REFERRING PHYSICIAN:Dr. Peewee Lincoln                Pre-treatment Symptoms/Complaints:  Reports that she has been a little sore over the last 2 days. State she has been on her computer more lately and is not sure whether that is contributing to it.   Pain: Initial:   No pain at rest Post Session: no pain at rest   Medications Last Reviewed:  2019  Updated Objective Findings:   strength: L 48 lbs (minor pain)    R : 45 lbs   TREATMENT:   Therapeutic Exercise: (15min) (Done in order to improve mobility, strength, function and overall understanding of condition)   Date  19 Date  19 Date  19   Activity/Exercise      Education Discussed HEP Discussed HEP Discussed    Self soft tissue mobilization Reviewed      Wrist extension 1lb, elbow bent, from neutral, 10x, 3 sets 2 lb, 15x, 3 sets, elbow bent, off pillow 3lbs, 10x, 3 sets   Wrist supination  - 2lb, partial motion, 10x, 2 sets Hammer, 10x, 2 sets, short  (had minor pain with this)   Prone Y  -    Thoracic rotation/ arm circles  HEP HEP HEP   90/90 shoulder ER HEP HEP HEP     Manual Therapy: (30min) (Done to improve mobility, reduce tone, guarding and pain)  · Soft tissue mobilization to extensors of left forearm, done with active wrist flexion and pronation  · Instrument assisted soft tissue mobilization to ECRB,lateral epicondyle and tendon    Modalities: (-)  [employer] Portal  Treatment/Session Summary:    · Response to Treatment: Ms. Natalie Milan had more soreness today than in the last several visits. She was able to do wrist extension with increased weight and without pain but had more soreness with the pronation/supination. Discussed to rest from strengthening over the next 1-2 days and then start the program up again. We will follow up in 1.5-2 wks. · Communication/Consultation:  None today   · Equipment provided today:  None today  · Recommendations/Intent for next treatment session: Next visit will focus on progressing established plan of care.   Treatment Plan of Care Effective Dates:  6-6-19 to 9-6-19  Total Treatment Billable Duration:  45 min    PT Patient Time In/Time Out  Time In: 1115  Time Out: 1200    Gato Goncalves PT, DPT

## 2019-08-22 ENCOUNTER — APPOINTMENT (OUTPATIENT)
Dept: PHYSICAL THERAPY | Age: 64
End: 2019-08-22
Payer: COMMERCIAL

## 2019-08-26 ENCOUNTER — APPOINTMENT (OUTPATIENT)
Dept: PHYSICAL THERAPY | Age: 64
End: 2019-08-26
Payer: COMMERCIAL

## 2019-08-29 ENCOUNTER — APPOINTMENT (OUTPATIENT)
Dept: PHYSICAL THERAPY | Age: 64
End: 2019-08-29
Payer: COMMERCIAL

## 2019-08-30 ENCOUNTER — HOSPITAL ENCOUNTER (OUTPATIENT)
Dept: PHYSICAL THERAPY | Age: 64
Discharge: HOME | End: 2019-08-30
Payer: COMMERCIAL

## 2019-08-30 PROCEDURE — 97140 MANUAL THERAPY 1/> REGIONS: CPT

## 2019-08-30 PROCEDURE — 97110 THERAPEUTIC EXERCISES: CPT

## 2019-08-30 NOTE — PROGRESS NOTES
Garrett Kamara  : 1955  Primary: Uzair Gama Ppo  Secondary:  [address] [place]  at [address]  [phone]   [phone]        OUTPATIENT PHYSICAL THERAPY: Daily Treatment Note  9/3/2019      ICD-10: Treatment Diagnosis: LATERAL EPICONDYLITIS M77.12     Precautions/Allergies: non reported   Fall Risk Score: 0 (? 5 = High Risk)  TREATMENT PLAN:  Effective Dates: 2019 TO 19. Frequency/Duration: 1 time a week for 90 Day(s) MEDICAL/REFERRING DIAGNOSIS:Left elbow pain [M25.522]  DATE OF ONSET: 2019  REFERRING PHYSICIAN:Dr. Carlson Single                Pre-treatment Symptoms/Complaints:  Reports that she has been doing well and almost cancelled because she had no pain but did wake up a little more sore today.   Pain: Initial:   No pain at rest Post Session: no pain at rest   Medications Last Reviewed:  9/3/2019  Updated Objective Findings:   strength: L 58 lbs (minor pain)    R : 50 lbs   TREATMENT:   Therapeutic Exercise: (10min) (Done in order to improve mobility, strength, function and overall understanding of condition)   Date  19 Date  19   Activity/Exercise     Education Discussed  Discussed going through full of ROM with the exercises and increasing the reps   Self soft tissue mobilization     Wrist extension 3lbs, 10x, 3 sets 3 lbs, 10x, 2 sets, full range   Wrist supination  Hammer, 10x, 2 sets, short  (had minor pain with this) 10x,   Prone Y  HEP   Thoracic rotation/ arm circles  HEP HEP   90/90 shoulder ER HEP HEP     Manual Therapy: (40min) (Done to improve mobility, reduce tone, guarding and pain)  · Soft tissue mobilization to extensors of left forearm, done with active wrist flexion and pronation  · Instrument assisted soft tissue mobilization to ECRB,lateral epicondyle and tendon  · Radial head PA mobilization with wrist supination    Modalities: (-)  MedBridge Portal  Treatment/Session Summary:    · Response to Treatment: Ms. Alberta Hayes continues to gradually progress. Discussed increasing her rep count and going through full range with the exercises. We will follow up in 2 wks. · Communication/Consultation:  None today   · Equipment provided today:  None today  · Recommendations/Intent for next treatment session: Next visit will focus on progressing established plan of care.   Treatment Plan of Care Effective Dates:  6-6-19 to 9-6-19  Total Treatment Billable Duration:  40 min    PT Patient Time In/Time Out  Time In: 1345  Time Out: [address] [facility],   [address], PT, DPT

## 2019-08-30 NOTE — THERAPY RECERTIFICATION
Peewee Dominguez  : 1955  Primary: Delia Rudd Ppo  Secondary:  Therapy Center at [address] [place] [address]  [phone]   [phone]         OUTPATIENT PHYSICAL THERAPY:Recertification [id number]    ICD-10: Treatment Diagnosis: Aline Clemons M77.12    Precautions/Allergies: non reported   Fall Risk Score: 0 (? 5 = High Risk)  TREATMENT PLAN:  Effective Dates: 19 TO 10-30-19. Frequency/Duration: 1 time every 2 weeks for 60 days MEDICAL/REFERRING DIAGNOSIS:Lateral epicondylitis, left elbow [M77.12]  DATE OF ONSET: 2019  316 Leonarda Gaytan MD        ASSESSMENT:  Ms. Ahmet Bond has come for a total of 7 visits since starting physical therapy on 19. During that time she has show gradual improvements and now has significant improvement in pain-free  strength, improved ROM and decreased overall tenderness at the lateral elbow. She still has pain with eccentric wrist extension and supination at end range motions which we are trying to work on. Skilled physical therapy is recommended to continue progressing her plan of care in order to return her to full function with minimal symptoms    PROBLEM LIST (Impacting functional limitations):  1. Pain with wrist extension and supination  2. Moderate tenderness and soft tissue tightness at the lateral epicondyle   3. Unable to perform functional tasks with left arm due to increased pain INTERVENTIONS PLANNED:  1. Home Exercise Program (HEP)  2. Manual Therapy  3. Therapeutic Exercise/Strengthening  4. Modalities including ultrasound as needed      GOALS: (Goals have been discussed and agreed upon with patient.)  SHORT-TERM FUNCTIONAL GOALS: Time Frame: 2-4 wks  1. Patient will report 25-50% reduction in overall symptoms (MET)  2. Patient will be compliant with HEP and plan of care (MET)  3.   Patient will be able to start a loading program without increase in pain of the forearm extensors  (MET)  4. Patient will improve on the DASH by 3-5 points (not tested)   DISCHARGE GOALS: Time Frame: 8-12 wk  1. Patient will report % reduction in overall symptoms in order to be able to have full function with decreased symptoms (ongoing)   2. Patient will report feeling comfortable progressing their own plan of care with the home program prescribed (ongoing)   3. Patient will report being able to perform household ADL's without increased pain  (ongoing)   4. Patient will improve on the DASH by 8-10 points in order to demonstrate improved function with less pain (not tested)    Rehabilitation Potential For Stated Goals: [facility] therapy, I certify that the treatment plan above will be carried out by a therapist or under their direction. Thank you for this referral,  Ish Stephens PT, DPT     Referring Physician Signature: Elder Pallas, MD _______________________________ Date _____________            HISTORY:   History of Present Injury/Illness (Reason for Referral): reports that she started having left lateral elbow pain after playing pickle ball in February 2019. No tingling or numbness in the arm. Neck and mid back pain are maintianing okay. Feels like she has not ever fully recovered from the  frozen shoulder.  States her goal is to return to full activity without pain  Past Medical History/Comorbidities:   Past Medical History:   Diagnosis Date    Osteoarthritis of hand     Osteopenia        R radial fracture with surgical fixation       Social History/Living Environment: enjoys hiking and pickle ball  Prior Level of Function/Work/Activity: independent/ retired   Current Medications:     None reported   Date Last Reviewed: 9/3/2019     Ambulatory/Rehab Services H2 Model Falls Risk Assessment    Risk Factors:       No Risk Factors Identified Ability to Rise from Chair:       (0)  Ability to rise in a single movement    Falls Prevention Plan:       No modifications necessary   Total: (5 or greater = High Risk): 0    ©2010 [facility] [employer] States Patent #[id number].  Federal Law prohibits the replication, distribution or use without written permission from [facility]      Number of Personal Factors/Comorbidities that affect the Plan of Care: 0: LOW COMPLEXITY   EXAMINATION:   (Abbreviations: P-pain, NP- no pain, wnl-within normal limits)  Observation/Orthostatic Postural Assessment:    Standing resting posture:  · Mild forward head posture, moderate scapular winging bilateral     Palpation/tone/tissue texture:    Soft tissue:  · Upper traps: mild tightness bilateral   · Levatore scapulae:mild tightness bilateral   · Forearm extensors: tenderness at common tendon and brachioradialis and extensor carpi radialis brevis  (improving)   PAIVM: (passive accessory intervertebral motion)  · Radial head mobility: mild tightness observed today towards end range supination       ROM:  (P-pain  NP-no pain)  Cervical ROM  (tested in sitting) Date:  8-30-19       Flexion wnl   Extension wnl   Rotation L wnl   Rotation R wnl   Quadrant exten n/a   Quadrant flex n/a     Shoulder ROM Date:  8-30-19       Right Left   Flexion wnl wnl   Abduction wnl wnl   IR wnl wnl   ER wnl wnl    Thoracic ROM  (tested in sitting) Date:  8-30-19       Flexion n/a   Extension Limited    Rotation L 75%   Rotation R 75%     Elbow ROM  (tested in sitting) Date:  8-30-19       Flexion wnl   Extension wnl   supination wnl   Pronation wnl           Strength:   Shoulder strength Date:  8-30-19       Right Left   Flexion 5 4_   Abduction 5 4+   IR 5 4+ in neutral and 90 deg abd   ER 5 4+ in neutral  4 at 90 deg abd     wrist strength Date:  8-30-10       Right Left   Flexion 5 5   Extension 5 4+, pain only at end range eccentric control   Pronation  5 5   Supination  5 5,pain only at end range eccentric control     strength 50 lbs 58 lbs, minor pain Scapular stability Date:  8-30-19       Right Left   Mid trap 4 4   Low trap 4 4   Rhomboids 4 4     Cervical Strength Date:  6-6-19   Chin tuck: n/a             Special Tests:     Neurological Screen:   Myotomes: strong in bilateral UE's         Dermatomes: intact in bilateral UE''s         Reflexes: n/a         Neural Tension Tests: ULTT (median): radial (-)  Functional Mobility:    diffculty with use of left arm especially anything involving wrist extension or supination (improving)   Balance:-     CLINICAL DECISION MAKING:   Outcome Measure: Tool Used: Disabilities of the Arm, Shoulder and Hand (DASH) Questionnaire - Quick Version (NEXT VISIT)  Score:  Initial: 30/55  Most Recent: X/55 (Date: -- )   Interpretation of Score: The DASH is designed to measure the activities of daily living in person's with upper extremity dysfunction or pain. Each section is scored on a 1-5 scale, 5 representing the greatest disability. The scores of each section are added together for a total score of 55. Medical Necessity:   · Patient is expected to demonstrate progress in strength, range of motion and symptom levels to return to full function. Reason for Services/Other Comments:  · Patient continues to require skilled intervention due to ongoing symptoms. · Pain: Initial:  0/10 at rest Post Session:  1/10 at rest, mild soreness ·   Compliance with Program/Exercises: compliant   · Recommendations/Intent for next treatment session: \"Next visit will focus on progressing the patients plan of care\".     Future Appointments   Date Time Provider Poonam Zapata   9/13/2019  1:45 PM Ambrosio Montalvo, PT, DPT [facility]     Total Treatment Duration: 40 min re-cert 5 mn  PT Patient Time In/Time Out  Time In: 4255  Time Out: [address] PT, DPT

## 2019-09-13 ENCOUNTER — HOSPITAL ENCOUNTER (OUTPATIENT)
Dept: PHYSICAL THERAPY | Age: 64
Discharge: HOME | End: 2019-09-13
Payer: COMMERCIAL

## 2019-09-13 PROCEDURE — 97140 MANUAL THERAPY 1/> REGIONS: CPT

## 2019-09-13 PROCEDURE — 97110 THERAPEUTIC EXERCISES: CPT

## 2019-09-13 NOTE — PROGRESS NOTES
Saurabh Katz  : 1955  Primary: Sugartim Lamikhail Ppo  Secondary:  [address] at [address]  [phone]   [phone]        OUTPATIENT PHYSICAL THERAPY: Daily Treatment Note  2019      ICD-10: Treatment Diagnosis: LATERAL EPICONDYLITIS M77.12     Precautions/Allergies: non reported   Fall Risk Score: 0 (? 5 = High Risk)  TREATMENT PLAN:  Effective Dates: 2019 TO 19. Frequency/Duration: 1 time a week for 90 Day(s) MEDICAL/REFERRING DIAGNOSIS:Left elbow pain [M25.522]  DATE OF ONSET: 2019  REFERRING PHYSICIAN:Dr. Erwin Venegas                Pre-treatment Symptoms/Complaints:  Reports that she is progressing gradually. States she is up to 4lbs and doing 20 reps/ 3 sets with elbow bent without any issues.  Still has some trouble with functional tasks in which her elbow is fully straight  Pain: Initial:   No pain at rest Post Session: no pain at rest   Medications Last Reviewed:  2019  Updated Objective Findings:   strength: L 58 lbs (minor pain)    R : 50 lbs   TREATMENT:   Therapeutic Exercise: (15min) (Done in order to improve mobility, strength, function and overall understanding of condition)   Date  19 Date  19 Date  19   Activity/Exercise      Education Discussed  Discussed going through full of ROM with the exercises and increasing the reps Discussed HEP   Self soft tissue mobilization   HEP   Wrist extension 3lbs, 10x, 3 sets 3 lbs, 10x, 2 sets, full range 2#, 15x, 3 sets, elbow fully extended   Wrist supination  Hammer, 10x, 2 sets, short  (had minor pain with this) 10x, 2#, 15x, 3 sets, elbow fully extended    Prone Y  HEP 10x (reviewed form)   Thoracic rotation/ arm circles  HEP HEP HEP   90/90 shoulder ER HEP HEP Discussed working at 90,120 and 150 deg of abduction/scaption     Manual Therapy: (30min) (Done to improve mobility, reduce tone, guarding and pain)  · Soft tissue mobilization to extensors of left forearm, done with active wrist flexion and pronation  · Instrument assisted soft tissue mobilization to ECRB,lateral epicondyle and tendon  · Radial head PA mobilization with wrist supination    Modalities: (-)  MedBridge Portal  Treatment/Session Summary:    · Response to Treatment: Ms. Meg Davison is progressing well. We discussed to start working on strengthening with her elbow straight and reduced the number of reps and the load. Also discussed working in different planes of elevation on rotator cuff and scapular stabilization to mimic tennis swing motions. · Communication/Consultation:  None today   · Equipment provided today:  None today  · Recommendations/Intent for next treatment session: Next visit will focus on progressing established plan of care.   Treatment Plan of Care Effective Dates:  6-6-19 to 9-6-19  Total Treatment Billable Duration:  45 min    PT Patient Time In/Time Out  Time In: 1345  Time Out: [address] [facility],   [address], PT, DPT

## 2019-10-07 ENCOUNTER — HOSPITAL ENCOUNTER (OUTPATIENT)
Dept: PHYSICAL THERAPY | Age: 64
Discharge: HOME | End: 2019-10-07
Payer: COMMERCIAL

## 2019-10-07 PROCEDURE — 97110 THERAPEUTIC EXERCISES: CPT

## 2019-10-07 PROCEDURE — 97140 MANUAL THERAPY 1/> REGIONS: CPT

## 2019-10-07 NOTE — PROGRESS NOTES
Lissa Clark  : 1955  Primary: Mara Martinez Ppo  Secondary:  [address] at [address]  [phone]   [phone]        OUTPATIENT PHYSICAL THERAPY: Daily Treatment Note  10/7/2019      ICD-10: Treatment Diagnosis: LATERAL EPICONDYLITIS M77.12     Precautions/Allergies: non reported   Fall Risk Score: 0 (? 5 = High Risk)  TREATMENT PLAN:  Effective Dates: 19 TO 10-30-19 Frequency/Duration: 1 time a week for 90 Day(s) MEDICAL/REFERRING DIAGNOSIS:Left elbow pain [M25.522]  DATE OF ONSET: 2019  REFERRING PHYSICIAN:Dr. Abdulaziz Quintero                Pre-treatment Symptoms/Complaints:  Reports that she is doing well overall. Just had a massage. Feels symptoms on occasion but more sporadically.    Pain: Initial:   No pain at rest Post Session: no pain at rest   Medications Last Reviewed:  10/7/2019  Updated Objective Findings:   strength: L 55 lbs (minor pain)    R : 45 lbs   TREATMENT:   Therapeutic Exercise: (25min) (Done in order to improve mobility, strength, function and overall understanding of condition)   Date  19 Date  19 Date  10-7-19   Activity/Exercise      Education Discussed going through full of ROM with the exercises and increasing the reps Discussed HEP Discussed HEP   Self soft tissue mobilization  HEP    Wrist extension 3 lbs, 10x, 2 sets, full range 2#, 15x, 3 sets, elbow fully extended 4# 10x,   Wrist supination  10x, 2#, 15x, 3 sets, elbow fully extended  4#, 10x   Prone Y HEP 10x (reviewed form) -   Thoracic rotation/ arm circles  HEP HEP -   90/90 shoulder ER HEP Discussed working at 90,120 and 150 deg of abduction/scaption -   D1 and D2 patterns   Focused on scapular retraction first and leading with the shoulder vs. Wrist, yellow band, 10x, 2 sets           Manual Therapy: (15min) (Done to improve mobility, reduce tone, guarding and pain)  · Soft tissue mobilization to extensors of left forearm, done with active wrist flexion and pronation  · Instrument assisted soft tissue mobilization to ECRB,lateral epicondyle and tendon (NOT DONE TODAY)  · Radial head PA mobilization with wrist supination    Modalities: (-)  MedBridge Portal  Treatment/Session Summary:    · Response to Treatment: Ms. Freeman Culp is progressing well. Added motor control training with D1 and D2 motions to mimic a tennis swing but improving her motor control with the motion to be more shoulder/scapular dominant and less wrist dominant. · Communication/Consultation:  None today   · Equipment provided today:  None today  · Recommendations/Intent for next treatment session: Next visit will focus on progressing established plan of care.     Total Treatment Billable Duration:  40 min    PT Patient Time In/Time Out  Time In: 1310  Time Out: 1350    Becki Nichols, PT, DPT

## 2019-10-28 ENCOUNTER — APPOINTMENT (OUTPATIENT)
Dept: PHYSICAL THERAPY | Age: 64
End: 2019-10-28
Payer: COMMERCIAL

## 2019-11-18 ENCOUNTER — HOSPITAL ENCOUNTER (OUTPATIENT)
Dept: PHYSICAL THERAPY | Age: 64
Discharge: HOME | End: 2019-11-18
Payer: COMMERCIAL

## 2019-11-18 PROCEDURE — 97110 THERAPEUTIC EXERCISES: CPT

## 2019-11-18 PROCEDURE — 97140 MANUAL THERAPY 1/> REGIONS: CPT

## 2019-11-18 NOTE — PROGRESS NOTES
Tala Araiza  : 1955  Primary: Alex Coy Ppo  Secondary:  [address] at [address]  [phone]   [phone]        OUTPATIENT PHYSICAL THERAPY: Daily Treatment Note  2019      ICD-10: Treatment Diagnosis: LATERAL EPICONDYLITIS M77.12     Precautions/Allergies: non reported   Fall Risk Score: 0 (? 5 = High Risk)  TREATMENT PLAN:  Effective Dates: 19 TO 10-30-19 Frequency/Duration: 1 time a week for 90 Day(s) MEDICAL/REFERRING DIAGNOSIS:Left elbow pain [M25.522]  DATE OF ONSET: 2019  REFERRING PHYSICIAN:Dr. Byron Hernandez                Pre-treatment Symptoms/Complaints:  Reports that she is doing well overall. She has had pain intermittently but it goes away relatively quickly. Has not had a chance to play pickle ball.   Pain: Initial:   No pain at rest Post Session: no pain at rest   Medications Last Reviewed:  2019  Updated Objective Findings:   strength: L 55 lbs (minor pain)    R : 45 lbs   TREATMENT:   Therapeutic Exercise: (25min) (Done in order to improve mobility, strength, function and overall understanding of condition)   Date  10-7-19 Date  19   Activity/Exercise     Education Discussed HEP Discussed HEP   Self soft tissue mobilization  -   Wrist extension 4# 10x, 4#, 15x, 2 sets   Wrist supination  4#, 10x 4#, 15x, 2 sets   Standing Y's - 10x, 3 sets, red band   Thoracic rotation/ arm circles  - HEP   90/90 shoulder ER - -   D1 and D2 patterns Focused on scapular retraction first and leading with the shoulder vs. Wrist, yellow band, 10x, 2 sets Focused on scapular retraction first and leading with the shoulder vs. Wrist, yellow band, 10x, 2 sets          Manual Therapy: (10min) (Done to improve mobility, reduce tone, guarding and pain)  · Soft tissue mobilization to extensors of left forearm, done with active wrist flexion and pronation  · Radial head PA mobilization with wrist supination    Modalities: (-)  [facility] Portal  Treatment/Session Summary:    · Response to Treatment: Ms. Chau Case tolerated the session well. She has progressed very well overall but still has some pain with end range resistance. Discussed her home program in length about how she should progress her plan of care. She is going to [place] for the fall and winter so she will be discharged today with an updated home program.  · Communication/Consultation:  None today   · Equipment provided today:  None today  · Recommendations/Intent for next treatment session: Next visit will focus on progressing established plan of care.     Total Treatment Billable Duration:  40 min, re-eval 5 min    PT Patient Time In/Time Out  Time In: 7394  Time Out: [address], PT, DPT

## 2019-11-19 NOTE — THERAPY RECERTIFICATION
Liu Car  : 1955  Primary: Altaf Dimitri Ppo  Secondary:  [address]  at [address]  [phone]   [phone]         OUTPATIENT PHYSICAL THERAPY:Discharge 2019    ICD-10: Treatment Diagnosis: LATERAL EPICONDYLITIS M77.12    Precautions/Allergies: non reported   Fall Risk Score: 0 (? 5 = High Risk) MEDICAL/REFERRING DIAGNOSIS:Lateral epicondylitis, left elbow [M77.12]  DATE OF ONSET: 2019  316 Leonarda Gaytan MD        ASSESSMENT:  Ms. Vashti Hawkins has come for a total of 10 visits since starting physical therapy on 19. During that time she has shown gradual progress and now has no pain at rest and is able to do just about any task without pain. She has yet to return to [place] but should be able to with minimal symptoms. She needs to continue working on strength and endurance and we also discussed her form with back hands. She is moving to [place] until the spring time so we are going to discharge her plan of care today. Thank you for the referral.    GOALS: (Goals have been discussed and agreed upon with patient.)  SHORT-TERM FUNCTIONAL GOALS: Time Frame: 2-4 wks  1. Patient will report 25-50% reduction in overall symptoms (MET)  2. Patient will be compliant with HEP and plan of care (MET)  3. Patient will be able to start a loading program without increase in pain of the forearm extensors  (MET)  4. Patient will improve on the DASH by 3-5 points (MET))   DISCHARGE GOALS: Time Frame: 8-12 wk  1. Patient will report % reduction in overall symptoms in order to be able to have full function with decreased symptoms (MET)   2. Patient will report feeling comfortable progressing their own plan of care with the home program prescribed (MET)   3. Patient will report being able to perform household ADL's without increased pain  (MET for the most part)   4.   Patient will improve on the DASH by 8-10 points in order to demonstrate improved function with less pain (MET)    Rehabilitation Potential For Stated Goals: GOOD    Regarding [facility] therapy, I certify that the treatment plan above will be carried out by a therapist or under their direction. Thank you for this referral,  Dale Sloan, PT, DPT                 HISTORY:   History of Present Injury/Illness (Reason for Referral): reports that she started having left lateral elbow pain after playing pickle ball in February 2019. No tingling or numbness in the arm. Neck and mid back pain are maintianing okay. Feels like she has not ever fully recovered from the  frozen shoulder. States her goal is to return to full activity without pain  Past Medical History/Comorbidities:   Past Medical History:   Diagnosis Date    Osteoarthritis of hand     Osteopenia        R radial fracture with surgical fixation       Social History/Living Environment: enjoys hiking and pickle ball  Prior Level of Function/Work/Activity: independent/ retired   Current Medications:     None reported   Date Last Reviewed: 11/19/2019     Ambulatory/Rehab Services H2 Model Falls Risk Assessment    Risk Factors:       No Risk Factors Identified Ability to Rise from Chair:       (0)  Ability to rise in a single movement    Falls Prevention Plan:       No modifications necessary   Total: (5 or greater = High Risk): 0    ©2010 [facility] of [employer]. All Rights Reserved. [facility] States Patent #[id number].  Federal Law prohibits the replication, distribution or use without written permission from [employer]      Number of Personal Factors/Comorbidities that affect the Plan of Care: 0: LOW COMPLEXITY   EXAMINATION:   (Abbreviations: P-pain, NP- no pain, wnl-within normal limits)  Observation/Orthostatic Postural Assessment:    Standing resting posture:  · Mild forward head posture, moderate scapular winging bilateral     Palpation/tone/tissue texture:    Soft tissue:  · Upper traps: mild tightness bilateral   · Levatore scapulae:mild tightness bilateral   · Forearm extensors: tenderness at common tendon and brachioradialis and extensor carpi radialis brevis  (much improved)   PAIVM: (passive accessory intervertebral motion)  · Radial head mobility: mild tightness observed today towards end range supination       ROM:  (P-pain  NP-no pain)  Cervical ROM  (tested in sitting) Date:  11-18-19       Flexion wnl   Extension wnl   Rotation L wnl   Rotation R wnl   Quadrant exten n/a   Quadrant flex n/a     Shoulder ROM Date:  11-18-19       Right Left   Flexion wnl wnl   Abduction wnl wnl   IR wnl wnl   ER wnl wnl    Thoracic ROM  (tested in sitting) Date:  11-18-19       Flexion n/a   Extension Limited    Rotation L 75%   Rotation R 75%     Elbow ROM  (tested in sitting) Date:  11-18-19       Flexion wnl   Extension wnl   supination wnl   Pronation wnl           Strength:   Shoulder strength Date:  11-8-19       Right Left   Flexion 5 4+   Abduction 5 4+   IR 5 5 in neutral and 90 deg abd   ER 5 5 in neutral  4+ at 90 deg abd     wrist strength Date:  11-18-19       Right Left   Flexion 5 5   Extension 5 4+, no pain throughout entire motion but w/ increased reps, some pain was felt   Pronation  5 5   Supination  5 5    strength 45 lbs 55 lbs, no pain      Scapular stability Date:  11-18-19       Right Left   Mid trap 4+ 4+   Low trap 4 4   Rhomboids 4+ 4+           Special Tests:     Neurological Screen:   Myotomes: strong in bilateral UE's         Dermatomes: intact in bilateral UE''s         Reflexes: n/a         Neural Tension Tests: ULTT (median): radial (-)  Functional Mobility:    Able to use L UE without difficulty. Has not had a chance to play pickle ball yet  Balance:-     CLINICAL DECISION MAKING:   Outcome Measure:    Tool Used: Disabilities of the Arm, Shoulder and Hand (DASH) Questionnaire - Quick Version   Score:  Initial: 30/55  Most Recent: 14/55 (Date: 11-18-19 )   Interpretation of Score: The DASH is designed to measure the activities of daily living in person's with upper extremity dysfunction or pain. Each section is scored on a 1-5 scale, 5 representing the greatest disability. The scores of each section are added together for a total score of 55.            · Pain: Initial:  0/10 at rest Post Session:  0/10 at rest       Total Treatment Duration: 40 min re-cert 5 mn  PT Patient Time In/Time Out  Time In: 1345  Time Out: [address]  PT, DPT

## 2020-05-19 ENCOUNTER — HOSPITAL ENCOUNTER (OUTPATIENT)
Dept: PHYSICAL THERAPY | Age: 65
Discharge: HOME | End: 2020-05-19
Payer: COMMERCIAL

## 2020-05-19 PROCEDURE — 97161 PT EVAL LOW COMPLEX 20 MIN: CPT

## 2020-05-19 PROCEDURE — 97110 THERAPEUTIC EXERCISES: CPT

## 2020-05-19 NOTE — THERAPY EVALUATION
Lisa Smith  : 1955  Primary: Bal [place] Ppo  Secondary:  [facility] [place] [address], [address], [place] [address]  [phone]   [phone]         OUTPATIENT PHYSICAL THERAPY:Initial Assessment 2020    ICD-10: Treatment Diagnosis: Pain in right hip (M25.551); DIFFICULTY WALKING, NOT ELSEWHERE CLASSIFIED R 26.2; Pain in right elbow (M25.521)    Precautions/Allergies: non reported  Fall Risk Score: 0 (? 5 = High Risk)  MD Orders: evaluate and treat  TREATMENT PLAN:  Effective Dates: 2020 TO 2020 (90 days). Frequency/Duration: 2 times a week for 90 Day(s) MEDICAL/REFERRING DIAGNOSIS:right hip right elbow pain   DATE OF ONSET: surgery date: 2-15-20  REFERRING PHYSICIAN:Masoud Rosa, [address]  RETURN PHYSICIAN APPOINTMENT: did not specify       ASSESSMENT:  Ms. Chidi Brush  presents to physical therapy with symptoms post R surgical stabilization of an intertrochanteric hip fracture and olecranon fracture . The examination reveals decreased hip ROM and strength as well as an antalgic gait pattern. She is also getting a radicular pain down the leg for which we have not found out the exact source. Her elbow has decreased extension and general strength. The examination is of low complexity due to a stable clinical presentation. Skilled physical therapy is recommended to progress the plan of care in order for the patient to return to full function with minimal symptoms. PROBLEM LIST (Impacting functional limitations):  1. Decreased hip strength  2. Decreased hip ROM  3. Leg length discrepancy  4. Antalgic gait pattern  5. Decreased elbow ROM  6. Triceps weakness  7. Tightness in scapula-thoracic region   INTERVENTIONS PLANNED:  1. Home Exercise Program (HEP)  2. Manual Therapy  3. Therapeutic Exercise/Strengthening  4. Cryotherapy w/ vaso-pneumatic compression  5.  Dry needling      GOALS: (Goals have been discussed and agreed upon with patient.)  SHORT-TERM FUNCTIONAL GOALS: Time Frame: 2-4 wks  1. Patient will report 25-50% reduction in overall symptoms  2. Patient will be compliant with HEP and plan of care  3. Patient will be able to perform side-lying abd without increased lower leg pain  4. Patient will improve on the LEFS by 3-5 points  5  DISCHARGE GOALS: Time Frame: 8-10 wk  1. Patient will report % reduction in overall symptoms in order to be able to have full function with decreased symptoms  2. Patient will report feeling comfortable progressing their own plan of care with the home program prescribed  3. Patient will be able to hike with minimal increase in pain (0-2/10)  4. Patient will improve on the LEFS by 8-10 points in order to demonstrate improved function with less pain    Rehabilitation Potential For Stated Goals: [place] therapy, I certify that the treatment plan above will be carried out by a therapist or under their direction. Thank you for this referral,  Lubna Simon PT,DPT    Referring Physician Signature: ROBERT Vargas _____________________________________________________ Date: __________________________________          HISTORY:   History of Present Injury/Illness (Reason for Referral): reports that she had a fall on Feb 15th while playing pickle ball. She went to the ED and found out she had an intertrochanteric fracture. She intermedullary screw placed. She was weight bearing right away. She also had a R olecranon fracture. She was in a 90 deg splint for her left elbow for 6 wks. She still feels less stable on her R leg. She feels pain in the R shin and at times she has foot slap on the R side. All of these symptoms started about 6 wks after the initial injury.    Past Medical History/Comorbidities:      Diagnosis Date    Osteoarthritis of hand     Osteopenia     R wrist fracture      Social History/Living Environment: lives at home with her   Prior Level of Function/Work/Activity:indpendent/ retired   Current Medications:       Current Outpatient Medications:     estradiol (ESTRACE) 0.01 % (0.1 mg/gram) vaginal cream, Insert 1 g into vagina two (2) times a week., Disp: 3 Tube, Rfl: 1    estradiol (IMVEXXY) 10 mcg inst, Insert 1 Insert into vagina two (2) times a week., Disp: 8 Insert, Rfl: 11    ESTRACE 0.01 % (0.1 mg/gram) vaginal cream, , Disp: , Rfl:     CALCIUM CARBONATE (CALCIUM 300 PO), Take 1 Tab by mouth daily. , Disp: , Rfl:     Cholecalciferol, Vitamin D3, (VITAMIN D3) 1,000 unit cap, Take  by mouth., Disp: , Rfl:     ASCORBATE CALCIUM (VITAMIN C PO), Take 1 Tab by mouth daily. , Disp: , Rfl:     FLAXSEED OIL (OMEGA 3 PO), Take 1 Tab by mouth daily. , Disp: , Rfl:     OTHER, Vit k2 liquid once daily, Disp: , Rfl:   Date Last Reviewed: 5/20/2020     Ambulatory/Rehab Services H2 Model Falls Risk Assessment    Risk Factors:       No Risk Factors Identified Ability to Rise from Chair:       (0)  Ability to rise in a single movement    Falls Prevention Plan:       No modifications necessary   Total: (5 or greater = High Risk): 0    ©2010 [facility] [employer]. All Rights Reserved. [facility] Patent #[id number].  Federal Law prohibits the replication, distribution or use without written permission from [facility] [employer]      Number of Personal Factors/Comorbidities that affect the Plan of Care: 0: LOW COMPLEXITY   EXAMINATION:   (Abbreviations: P-pain, NP- no pain, wnl-within normal limits)  Observation/Orthostatic Postural Assessment:    Relaxed standing posture:  · L inonimate is higher, weight shift towards R   · Scoliotic curvature noted in upper-mid  thoracic      Palpation/tone/tissue texture:    ASIS: R lower in supine  PSIS: n/a  Leg Length: supine: R shorter in supine        Long Sitting:      Soft tissue:  · Hamstring: wnl  · Hip flexors: wnl  · Adductors :wnl  · Lateral hip: increased tone and tenderness on R side      ROM:   Multisegmental ROM Date:  5/20/2020       Flexion Wnl, NP   Extension Wnl, NP   Rotation L wnl   Rotation R wnl   extension (-) bilateral    flexion (-) bilateral    shear Mild mobility loss bilateral, no reproduction of symptoms       Hip ROM Date:  5/20/2020       Right Left   Flexion 95 deg, hard end feel wnl   Extension wnl wnl   IR Limited at 90 deg wnl   ER wnl wnl     Elbow  ROM Date:  5/20/2020       Right Left   Flexion Mild end range limitation Mild end range limitation   Extension 10 deg flex contracture 0 deg         Strength:     Hip Strength Date:  5/20/2020       Right Left   Flexion 4 5   Extension 4 4+   IR 4 4+   ER 4 4+   Abduction 4- (pain) 5      Elbow strength Date:  5/20/2020       Right Left   Flexion 4 5   Extension 4 5             Special Tests:   ALFREDO: R: -     L:-  FADIR:  R: + for compression    L:  (-)    Neurological Screen:   Myotomes: L4 wkns on R      Dermatomes: intact in bilateral LE's         Reflexes: patella 3+, achilles 2+         Neural Tension Tests: SLR (-), femoral (-)  Obturator (-)  Functional Mobility:    · Double leg squat: partial   · Single leg squat:  L: n/a        R:n/a  · Gait Pattern: ambulates with increased initial impact on R, decreased mid-terminal stance control on the R side, decreased anterior inonimate elevation on R side  Balance:  Single leg   L: 5 sec, mild sway  R:5 sec, moderate sway   Body Structures Involved:  1. Nerves  2. Bones  3. Joints  4. Muscles Body Functions Affected:  1. Sensory/Pain  2. Neuromusculoskeletal  3. Movement Related Activities and Participation Affected:  1. General Tasks and Demands  2. Mobility  3. Self Care  4. Domestic Life  5. Community, Social and [place]   Number of elements that affect the Plan of Care: 4+: HIGH COMPLEXITY   CLINICAL PRESENTATION:   Presentation: Evolving clinical presentation with changing clinical characteristics: MODERATE COMPLEXITY   CLINICAL DECISION MAKING:   Outcome Measure:    Tool Used: Lower Extremity Functional Scale (LEFS)  Score:  Initial: 47/80 Most Recent: X/80 (Date: -- )   Interpretation of Score: 20 questions each scored on a 5 point scale with 0 representing \"extreme difficulty or unable to perform\" and 4 representing \"no difficulty\". The lower the score, the greater the functional disability. 80/80 represents no disability. Minimal detectable change is 9 points. Tool Used: Disabilities of the Arm, Shoulder and Hand (DASH) Questionnaire - Quick Version  Score:  Initial: 22/55  Most Recent: X/55 (Date: -- )   Interpretation of Score: The DASH is designed to measure the activities of daily living in person's with upper extremity dysfunction or pain. Each section is scored on a 1-5 scale, 5 representing the greatest disability. The scores of each section are added together for a total score of 55. Score 11 12-19 20-28 29-37 38-45 46-54 55   Modifier CH CI CJ CK CL CM CN     Medical Necessity:   · Patient is expected to demonstrate progress in strength, range of motion and symptom levels to return to full function. Reason for Services/Other Comments:  · Patient continues to require skilled intervention due to ongoing symptoms. Use of outcome tool(s) and clinical judgement create a POC that gives a: Questionable prediction of patient's progress: MODERATE COMPLEXITY       · Pain: Initial:   7/10  Post Session:  Did not assess ·   Compliance with Program/Exercises: Will assess as treatment progresses. · Recommendations/Intent for next treatment session: \"Next visit will focus on progressing the patients plan of care\".     Future Appointments   Date Time Provider Poonam Zapata   5/22/2020  9:15 AM Otaida Goffa, PT, DPT [facility]   5/26/2020  3:15 PM Otaida Goffa, PT, DPT [facility]   5/28/2020 11:15 AM Otaida Goffa, PT, DPT [facility]   6/1/2020 10:30 AM Othel Truro, PT, DPT [facility]   6/4/2020  9:15 AM [facility]   6/8/2020 10:30 AM [facility] Knox, PT, [facility]   6/11/2020 10:30 AM [facility] PT, [facility]     Total Treatment Duration: 5 min, evaluation - 50 min  PT Patient Time In/Time Out  Time In: 7877  Time Out: American Alfonso Ford, PT, DPT

## 2020-05-20 ENCOUNTER — HOSPITAL ENCOUNTER (OUTPATIENT)
Dept: PHYSICAL THERAPY | Age: 65
Discharge: HOME | End: 2020-05-20
Payer: COMMERCIAL

## 2020-05-20 PROCEDURE — 97140 MANUAL THERAPY 1/> REGIONS: CPT

## 2020-05-20 PROCEDURE — 97110 THERAPEUTIC EXERCISES: CPT

## 2020-05-20 NOTE — PROGRESS NOTES
Mahad Newer  : 1955  Primary: Ady Modi [employer] Ppo  Secondary:  [facility], [address], [place] [address]  [phone]   [phone]        OUTPATIENT PHYSICAL THERAPY: Daily Treatment Note  2020    2  ICD-10: Treatment Diagnosis: Pain in right hip (M25.551); DIFFICULTY WALKING, NOT ELSEWHERE CLASSIFIED R 26.2; Pain in right elbow (M25.521)     Precautions/Allergies: non reported  Fall Risk Score: 0 (? 5 = High Risk)  MD Orders: evaluate and treat  TREATMENT PLAN:  Effective Dates: 2020 TO 2020 (90 days). Frequency/Duration: 2 times a week for 90 Day(s) MEDICAL/REFERRING DIAGNOSIS:right hip right elbow pain   DATE OF ONSET: surgery date: 2-15-20  REFERRING PHYSICIAN:Julieth Rosa, [address]  RETURN PHYSICIAN APPOINTMENT           Pre-treatment Symptoms/Complaints:  Reports that this injury has really affected her mentally.  Pain has been about the same since yesterday   Pain: Initial:   Pain Intensity 1: (P) 5  Post Session:  Similar in standing   Medications Last Reviewed:  2020  Updated Objective Findings:  See evaluation    TREATMENT:   Therapeutic Exercise: (45 min) (Done in order to improve mobility, strength, function and overall understanding of condition)   Date:  20   Activity/Exercise Parameters   Education · Discussed HEP, plan of care  · Discussed self soft tissue of the lateral hip  · Discussed doing rows, W's and lat stretching of her R shoulder   Posterior hip depression R side, 10 sec hold, 5x   bridge W/ abd resistance, 10x, 2 sets, green abnd   Hip flex w/ DF Against manual resistance, 10x, 2 sets   Hip IR In prone, 10x, blue band, 2 sets     Manual Therapy: (25 min) (Done to improve mobility, reduce tone, guarding and pain)  ·  soft tissue mobilization to glute med and min, piriformis and small rotators at greater trochanter  · Posterior and anterior inonimate depression  · R mid thoracic and costal cage PA mobilization,Grade II    Modalities: (-)  MedBridge Portal  Treatment/Session Summary:    · Response to Treatment: we were able to work on strengthening in pain free patterns; however, she still cannot do abduction without increased pain. She also had improved strength of her ankle DF. Her lateral lower leg pain is only reproduced with loading of the R LE in weight bearing or abd of the hip in side-lying and since I cannot reproduce it with anything in the spine, I think the pain could just be a referral from the glut med, min muscle complex. Discussed her updated HEP  · Communication/Consultation:  None today  · Equipment provided today:  None today  · Recommendations/Intent for next treatment session: Next visit will focus on progressing established plan of care.   Total Treatment Billable Duration:  70 min    PT Patient Time In/Time Out  Time In: 0915  Time Out: 1030    Theo Baxter, PT, DPT

## 2020-05-20 NOTE — PROGRESS NOTES
Angela Corye  : 1955  Primary: Darwin Gaster Ppo  Secondary:  [facility]  [facility] [address], [place] [address]  [phone]   [phone]        OUTPATIENT PHYSICAL THERAPY: Daily Treatment Note  2020    1  ICD-10: Treatment Diagnosis: Pain in right hip (M25.551); DIFFICULTY WALKING, NOT ELSEWHERE CLASSIFIED R 26.2; Pain in right elbow (M25.521)     Precautions/Allergies: non reported  Fall Risk Score: 0 (? 5 = High Risk)  MD Orders: evaluate and treat  TREATMENT PLAN:  Effective Dates: 2020 TO 2020 (90 days). Frequency/Duration: 2 times a week for 90 Day(s) MEDICAL/REFERRING DIAGNOSIS:right hip right elbow pain   DATE OF ONSET: surgery date: 2-15-20  REFERRING PHYSICIAN:Zacarias Rosa [place]  RETURN PHYSICIAN APPOINTMENT           Pre-treatment Symptoms/Complaints:  Reports ongoing hip and R lateral lower leg pain since the surgery. Also reports R elbow and shoulder pain  Pain: Initial:   Pain Intensity 1: (P) 7  Post Session:  Did not assess    Medications Last Reviewed:  2020  Updated Objective Findings:  See evaluation    TREATMENT:   Therapeutic Exercise: (5 min) (Done in order to improve mobility, strength, function and overall understanding of condition)   Date:  20   Activity/Exercise Parameters   Education Discussed HEP, plan of care             Manual Therapy: (-) (Done to improve mobility, reduce tone, guarding and pain)  ·      Modalities: (-)  Compute Portal  Treatment/Session Summary:    · Response to Treatment: Ms. Estevan Gaitan had several issues going on that we did not have time to fully address. We discussed to start formal treatment next visit. · Communication/Consultation:  None today  · Equipment provided today:  None today  · Recommendations/Intent for next treatment session: Next visit will focus on progressing established plan of care.   Total Treatment Billable Duration:  5 min, evaluation 55 min    PT Patient Time In/Time Out  Time In: 1435  Time Out: 1530    Leonard Goltz, PT, DPT

## 2020-05-22 ENCOUNTER — HOSPITAL ENCOUNTER (OUTPATIENT)
Dept: PHYSICAL THERAPY | Age: 65
Discharge: HOME | End: 2020-05-22
Payer: COMMERCIAL

## 2020-05-22 PROCEDURE — 97140 MANUAL THERAPY 1/> REGIONS: CPT

## 2020-05-22 PROCEDURE — 97110 THERAPEUTIC EXERCISES: CPT

## 2020-05-22 NOTE — PROGRESS NOTES
Orkarunachristine Oates  : 1955  Primary: Troy Church Ppo  Secondary:  [facility]  [facility], [address], [place] [address]  [phone]   [phone]        OUTPATIENT PHYSICAL THERAPY: Daily Treatment Note  2020    3  ICD-10: Treatment Diagnosis: Pain in right hip (M25.551); DIFFICULTY WALKING, NOT ELSEWHERE CLASSIFIED R 26.2; Pain in right elbow (M25.521)     Precautions/Allergies: non reported  Fall Risk Score: 0 (? 5 = High Risk)  MD Orders: evaluate and treat  TREATMENT PLAN:  Effective Dates: 2020 TO 2020 (90 days). Frequency/Duration: 2 times a week for 90 Day(s) MEDICAL/REFERRING DIAGNOSIS:right hip right elbow pain   DATE OF ONSET: surgery date: 2-15-20  REFERRING PHYSICIAN:Ajit Rosa [place]  RETURN PHYSICIAN APPOINTMENT           Pre-treatment Symptoms/Complaints:  Reports that she was sore after her last visit.  Feels relatively okay this morning   Pain: Initial:     minor pain at rest Post Session:  Similar in standing   Medications Last Reviewed:  2020  Updated Objective Findings:  See evaluation    TREATMENT:   Therapeutic Exercise: (20 min) (Done in order to improve mobility, strength, function and overall understanding of condition)   Date:  20 Date  20   Activity/Exercise Parameters    Education · Discussed HEP, plan of care  · Discussed self soft tissue of the lateral hip  · Discussed doing rows, W's and lat stretching of her R shoulder · Discussed HEP   Posterior hip depression R side, 10 sec hold, 5x R side, 10 sec hold, 5x   bridge W/ abd resistance, 10x, 2 sets, green abnd Bridge w/ abd resistance, 10x, 3 sets   Hip flex w/ DF Against manual resistance, 10x, 2 sets 10x (stopped exercise for now)   Hip IR In prone, 10x, blue band, 2 sets In prone, 10x, manually resisted    Hip extension stretch  60 sec, 2x (in supine)     Manual Therapy: (25 min) (Done to improve mobility, reduce tone, guarding and pain)  · Soft tissue mobilization to glute med and min, piriformis and small rotators at greater trochanter (not done today)   · Posterior/inferior hip capsule mobilization, Grade IV  · Hip distraction, in supine   · Posterior and anterior inonimate depression  · R mid thoracic and costal cage PA mobilization,Grade II (not done today)    Modalities: (-)  MedBridge Portal  Treatment/Session Summary:    · Response to Treatment: Patient tolerated the session well. Discussed her home program for hip mobility and strength in neutral positions. · Communication/Consultation:  None today  · Equipment provided today:  None today  · Recommendations/Intent for next treatment session: Next visit will focus on progressing established plan of care.   Total Treatment Billable Duration:  50 min    PT Patient Time In/Time Out  Time In: 0915  Time Out: [address] [facility], PT, DPT

## 2020-05-26 ENCOUNTER — HOSPITAL ENCOUNTER (OUTPATIENT)
Dept: PHYSICAL THERAPY | Age: 65
Discharge: HOME | End: 2020-05-26
Payer: COMMERCIAL

## 2020-05-26 PROCEDURE — 97140 MANUAL THERAPY 1/> REGIONS: CPT

## 2020-05-26 PROCEDURE — 97110 THERAPEUTIC EXERCISES: CPT

## 2020-05-26 NOTE — PROGRESS NOTES
Anuradha Barnard  : 1955  Primary: Arbie Cough Ppo  Secondary:  [facility], [address], [place] [address]  [phone]   [phone]        OUTPATIENT PHYSICAL THERAPY: Daily Treatment Note  2020    4  ICD-10: Treatment Diagnosis: Pain in right hip (M25.551); DIFFICULTY WALKING, NOT ELSEWHERE CLASSIFIED R 26.2; Pain in right elbow (M25.521)     Precautions/Allergies: non reported  Fall Risk Score: 0 (? 5 = High Risk)  MD Orders: evaluate and treat  TREATMENT PLAN:  Effective Dates: 2020 TO 2020 (90 days). Frequency/Duration: 2 times a week for 90 Day(s) MEDICAL/REFERRING DIAGNOSIS:right hip right elbow pain   DATE OF ONSET: surgery date: 2-15-20  REFERRING PHYSICIAN:Anthony Rosa, [address]  RETURN PHYSICIAN APPOINTMENT           Pre-treatment Symptoms/Complaints:  Reports that she is doing about the same. Pain is mostly with weight bearing.     Pain: Initial:     minor pain at rest Post Session:  Similar in standing   Medications Last Reviewed:  2020  Updated Objective Findings:  See evaluation    TREATMENT:   Therapeutic Exercise: (30min) (Done in order to improve mobility, strength, function and overall understanding of condition)   Date:  20 Date  20 Date  20   Activity/Exercise Parameters     Education · Discussed HEP, plan of care  · Discussed self soft tissue of the lateral hip  · Discussed doing rows, W's and lat stretching of her R shoulder · Discussed HEP · Discussed HEP   Posterior hip depression R side, 10 sec hold, 5x R side, 10 sec hold, 5x    bridge W/ abd resistance, 10x, 2 sets, green abnd Bridge w/ abd resistance, 10x, 3 sets 10x, 3 sets, abd, green band   Hip flex w/ DF Against manual resistance, 10x, 2 sets 10x (stopped exercise for now) -   Hip IR In prone, 10x, blue band, 2 sets In prone, 10x, manually resisted  10x, 3 sets, green band   Hip extension stretch  60 sec, 2x (in supine) 60 sec, 2x, modified giovani position   squat   Wall squat, double leg, 10x, 3 sets     Manual Therapy: (20 min) (Done to improve mobility, reduce tone, guarding and pain)  · Soft tissue mobilization to glute med and min, piriformis and small rotators at greater trochanter (not done today)   · Posterior/inferior hip capsule mobilization, Grade IV  · Hip distraction, in supine   · Posterior and anterior inonimate depression (not done today)  · R mid thoracic and costal cage PA mobilization,Grade II (not done today)    Modalities: (-)  MedBridge Portal  Treatment/Session Summary:    · Response to Treatment: Ms. Cedrick Thrasher still has pain with any kind of pressure through the R LE. We discussed non painful strengthening progressions. Her doctor will be contacted to notify him of her continued radiating lower leg pain. She still has a hard end feel with hip flexion. · Communication/Consultation:  None today  · Equipment provided today:  None today  · Recommendations/Intent for next treatment session: Next visit will focus on progressing established plan of care.   Total Treatment Billable Duration:  50 min    PT Patient Time In/Time Out  Time In: 9331  Time Out: Freya 20, PT, DPT

## 2020-05-28 ENCOUNTER — APPOINTMENT (OUTPATIENT)
Dept: PHYSICAL THERAPY | Age: 65
End: 2020-05-28
Payer: COMMERCIAL

## 2020-05-29 ENCOUNTER — HOSPITAL ENCOUNTER (OUTPATIENT)
Dept: PHYSICAL THERAPY | Age: 65
Discharge: HOME | End: 2020-05-29
Payer: COMMERCIAL

## 2020-05-29 PROCEDURE — 97140 MANUAL THERAPY 1/> REGIONS: CPT

## 2020-05-29 PROCEDURE — 97110 THERAPEUTIC EXERCISES: CPT

## 2020-05-29 NOTE — PROGRESS NOTES
Anuradha Barnard  : 1955  Primary: Arbie Cough Ppo  Secondary:  [facility]rapy Center at [facility]  [address] , [address], [place] [address]  [phone]   [phone]        OUTPATIENT PHYSICAL THERAPY: Daily Treatment Note  2020    5  ICD-10: Treatment Diagnosis: Pain in right hip (M25.551); DIFFICULTY WALKING, NOT ELSEWHERE CLASSIFIED R 26.2; Pain in right elbow (M25.521)     Precautions/Allergies: non reported  Fall Risk Score: 0 (? 5 = High Risk)  MD Orders: evaluate and treat  TREATMENT PLAN:  Effective Dates: 2020 TO 2020 (90 days). Frequency/Duration: 2 times a week for 90 Day(s) MEDICAL/REFERRING DIAGNOSIS:right hip right elbow pain   DATE OF ONSET: surgery date: 2-15-20  REFERRING PHYSICIAN:Anthony Rosa [place]  RETURN PHYSICIAN APPOINTMENT           Pre-treatment Symptoms/Complaints:  Reports that her R hip is doing about the same. States she has gone swimming and biking without much of an issue. States she would like to work on her elbow and shoulder today because they have also been bothering her. Pain: Initial:     minor pain at rest Post Session:  Reports feeling pretty good in shoulder region.    Medications Last Reviewed:  2020  Updated Objective Findings:  See evaluation    TREATMENT:   Therapeutic Exercise: (30min) (Done in order to improve mobility, strength, function and overall understanding of condition)   Date  20 Date  20   Activity/Exercise     Education · Discussed HEP · Discussed HEP   Posterior hip depression  -   bridge 10x, 3 sets, abd, green band -   Hip flex w/ DF - -   Hip IR 10x, 3 sets, green band -   Hip extension stretch 60 sec, 2x, modified giovani position -   squat Wall squat, double leg, 10x, 3 sets -   Rhythmic stabilization  In various angles, protected contraction facilitated first, ER and IR resistance, 15 sec, 5x   Shoulder IR  10x,3 sets, green band, partial distance   PNF extension  Worked in partial range, used manual resistance, 10x, 3 sets  3lbs with cable maching   Forward elevation w/ ER resistance  Yellow band,10x, 2 sets     Manual Therapy: (15 min) (Done to improve mobility, reduce tone, guarding and pain)  · Scapular downward depression facilitation  · Soft tissue mobilization around the scapula      · Soft tissue mobilization to glute med and min, piriformis and small rotators at greater trochanter (not done today)   · Posterior/inferior hip capsule mobilization, Grade IV (not done today)  · Posterior and anterior inonimate depression (not done today)  · R mid thoracic and costal cage PA mobilization,Grade II (not done today)    Modalities: (-)  [employer] Portal  Treatment/Session Summary:    · Response to Treatment: Ms. Norma Moncada had moderate anterior shoulder weakness especially at upper ends of elevation. We started a program targeting the stabilization of the glenohumeral joint in more mid range positions and will progress to end range positions. · Communication/Consultation:  None today  · Equipment provided today:  None today  · Recommendations/Intent for next treatment session: Next visit will focus on progressing established plan of care.   Total Treatment Billable Duration:  50 min    PT Patient Time In/Time Out  Time In: 1115  Time Out: [address] [facility] Dion PT, DPT

## 2020-06-02 ENCOUNTER — HOSPITAL ENCOUNTER (OUTPATIENT)
Dept: PHYSICAL THERAPY | Age: 65
Discharge: HOME | End: 2020-06-02
Payer: COMMERCIAL

## 2020-06-02 PROCEDURE — 97140 MANUAL THERAPY 1/> REGIONS: CPT

## 2020-06-02 PROCEDURE — 20561 NDL INSJ W/O NJX 3+ MUSC: CPT

## 2020-06-02 PROCEDURE — 97110 THERAPEUTIC EXERCISES: CPT

## 2020-06-02 NOTE — PROGRESS NOTES
Tim Sánchez  : 1955  Primary: Rogers Brandt Ppo  Secondary:  [facility]rapy Center at [facility]  [address] , [address], [place] [address]  [phone]   [phone]        OUTPATIENT PHYSICAL THERAPY: Daily Treatment Note  2020    6  ICD-10: Treatment Diagnosis: Pain in right hip (M25.551); DIFFICULTY WALKING, NOT ELSEWHERE CLASSIFIED R 26.2; Pain in right elbow (M25.521)     Precautions/Allergies: non reported  Fall Risk Score: 0 (? 5 = High Risk)  MD Orders: evaluate and treat  TREATMENT PLAN:  Effective Dates: 2020 TO 2020 (90 days). Frequency/Duration: 2 times a week for 90 Day(s) MEDICAL/REFERRING DIAGNOSIS:right hip right elbow pain   DATE OF ONSET: surgery date: 2-15-20  REFERRING PHYSICIAN:Yeimy Rosa [place]  RETURN PHYSICIAN APPOINTMENT           Pre-treatment Symptoms/Complaints:  Reports that she is doing about the same. States she saw her doctor for osteoporosis who recommended for her to go on medication to try to improve bone quality. Pain: Initial:     minor pain at rest Post Session:  Reports feeling pretty good in shoulder region.    Medications Last Reviewed:  2020  Updated Objective Findings:  See evaluation    TREATMENT:   Therapeutic Exercise: (15min) (Done in order to improve mobility, strength, function and overall understanding of condition)   Date  20 Date  20 Date  20   Activity/Exercise      Education · Discussed HEP · Discussed HEP · Discussed HEP   Posterior hip depression  - -   bridge 10x, 3 sets, abd, green band - -   Hip flex w/ DF - - -   Hip IR 10x, 3 sets, green band - -   Hip extension stretch 60 sec, 2x, modified giovani position - -   squat Wall squat, double leg, 10x, 3 sets - -   Rhythmic stabilization  In various angles, protected contraction facilitated first, ER and IR resistance, 15 sec, 5x -   Shoulder IR  10x,3 sets, green band, partial distance -   PNF extension  Worked in partial range, used manual resistance, 10x, 3 sets  3lbs with cable maching Worked in partial range, used manual resistance, 10x,2 sets   Forward elevation w/ ER resistance  Yellow band,10x, 2 sets -     Manual Therapy: (15 min) (Done to improve mobility, reduce tone, guarding and pain)  · Scapular downward depression facilitation  · Soft tissue mobilization around the scapula    · Soft tissue mobilization to glute med and min, piriformis and small rotators at greater trochanter   · Posterior/inferior hip capsule mobilization, Grade IV   · Posterior and anterior inonimate depression (not done today)  · R mid thoracic and costal cage PA mobilization,Grade II (not done today)     Dry needling (15 min) ( done to improve muscle tone and pain)  Dry needling to lateral hip and small rotators (glut med, glut min, piriformis, obturator externus): 6 needles total, done with ESTIM        Modalities: (-)  [employer] Portal  Treatment/Session Summary:    · Response to Treatment: Ms. [facility] tolerated the session well. She had moderate tone in her hip rotators which improved with the manual treatment and dry needling. We will see if that helps with her shin pain. Discussed her home program.   · Communication/Consultation:  None today  · Equipment provided today:  None today  · Recommendations/Intent for next treatment session: Next visit will focus on progressing established plan of care.   Total Treatment Billable Duration:  45 min    PT Patient Time In/Time Out  Time In: 1345  Time Out: [address] [facility],   [address], PT, DPT

## 2020-06-05 ENCOUNTER — HOSPITAL ENCOUNTER (OUTPATIENT)
Dept: PHYSICAL THERAPY | Age: 65
Discharge: HOME | End: 2020-06-05
Payer: COMMERCIAL

## 2020-06-05 PROCEDURE — 97110 THERAPEUTIC EXERCISES: CPT

## 2020-06-05 PROCEDURE — 97140 MANUAL THERAPY 1/> REGIONS: CPT

## 2020-06-05 NOTE — PROGRESS NOTES
Keena Frazier  : 1955  Primary: Jennifer Patricia Ppo  Secondary:  [address] at [facility]  [address], [address], [place] [address]  [phone]   [phone]        OUTPATIENT PHYSICAL THERAPY: Daily Treatment Note  2020    7  ICD-10: Treatment Diagnosis: Pain in right hip (M25.551); DIFFICULTY WALKING, NOT ELSEWHERE CLASSIFIED R 26.2; Pain in right elbow (M25.521)     Precautions/Allergies: non reported  Fall Risk Score: 0 (? 5 = High Risk)  MD Orders: evaluate and treat  TREATMENT PLAN:  Effective Dates: 2020 TO 2020 (90 days). Frequency/Duration: 2 times a week for 90 Day(s) MEDICAL/REFERRING DIAGNOSIS:right hip right elbow pain   DATE OF ONSET: surgery date: 2-15-20  REFERRING PHYSICIAN:Debra Rosa [place]  RETURN PHYSICIAN APPOINTMENT           Pre-treatment Symptoms/Complaints:  Reports that she is having a good today but states she never knows when the pain will hit.   Pain: Initial:     no pain reported at rest Post Session: no pain reported at rest   Medications Last Reviewed:  2020  Updated Objective Findings:  See evaluation    TREATMENT:   Therapeutic Exercise: (30min) (Done in order to improve mobility, strength, function and overall understanding of condition)   Date  20 Date  20 Date  20   Activity/Exercise      Education · Discussed HEP · Discussed HEP · Discussed HEP   Clams   Red band, 10x, 3 sets   Side-lying abdcution   No resistance, 10x,3 sets   Rhythmic stabilization In various angles, protected contraction facilitated first, ER and IR resistance, 15 sec, 5x - -   Shoulder IR 10x,3 sets, green band, partial distance - -   PNF extension Worked in partial range, used manual resistance, 10x, 3 sets  3lbs with cable maching Worked in partial range, used manual resistance, 10x,2 sets Worked in partial range, used manual resistance, 10x,2 sets   Forward elevation w/ ER resistance Yellow band,10x, 2 sets - -     Manual Therapy: (30 min) (Done to improve mobility, reduce tone, guarding and pain)  · Soft tissue mobilization to glute med and min, piriformis and small rotators at greater trochanter and lateral femur    · Posterior/inferior hip capsule mobilization, Grade IV   · R elbow mobilization into extension, grade IV  · Scapular downward depression facilitation (NOT DONE TODAY)  · Soft tissue mobilization around the scapula (NOT DONE TODAY)    Dry needling (0 min) ( done to improve muscle tone and pain)  (NOT DONE TODAY)  Dry needling to lateral hip and small rotators (glut med, glut min, piriformis, obturator externus): 6 needles total, done with ESTIM        Modalities: (-)  [employer] Portal  Treatment/Session Summary:    · Response to Treatment: Ms. Sandrine Dye tolerated the session well. After re-examining her lumbar spine,SI and LE, I feel that the most likely diagnosis to her lateral shin pain is glut med tendinopathy. We discussed progressing with a more aggressive and consistent loading program on the glut med/min tendon. Discussed to not push through pain that is greater than 4/10. · Communication/Consultation:  None today  · Equipment provided today:  None today  · Recommendations/Intent for next treatment session: Next visit will focus on progressing established plan of care.   Total Treatment Billable Duration:  60 min    PT Patient Time In/Time Out  Time In: 0972  Time Out: [address], PT, DPT

## 2020-06-08 ENCOUNTER — HOSPITAL ENCOUNTER (OUTPATIENT)
Dept: PHYSICAL THERAPY | Age: 65
Discharge: HOME | End: 2020-06-08
Payer: COMMERCIAL

## 2020-06-08 PROCEDURE — 97140 MANUAL THERAPY 1/> REGIONS: CPT

## 2020-06-08 PROCEDURE — 97110 THERAPEUTIC EXERCISES: CPT

## 2020-06-08 NOTE — PROGRESS NOTES
Snow Apt  : 1955  Primary: Lexa Needs Ppo  Secondary:  [address]  at [facility]  [address], [address], [place] [address]  [phone]   [phone]        OUTPATIENT PHYSICAL THERAPY: Daily Treatment Note  2020    8  ICD-10: Treatment Diagnosis: Pain in right hip (M25.551); DIFFICULTY WALKING, NOT ELSEWHERE CLASSIFIED R 26.2; Pain in right elbow (M25.521)     Precautions/Allergies: non reported  Fall Risk Score: 0 (? 5 = High Risk)  MD Orders: evaluate and treat  TREATMENT PLAN:  Effective Dates: 2020 TO 2020 (90 days). Frequency/Duration: 2 times a week for 90 Day(s) MEDICAL/REFERRING DIAGNOSIS:right hip right elbow pain   DATE OF ONSET: surgery date: 2-15-20  REFERRING PHYSICIAN:Ton Rosa [place]  RETURN PHYSICIAN APPOINTMENT           Pre-treatment Symptoms/Complaints:  Reports that she felt some better over the wknd. Her hip groin and shin were not as bad but still there.   Pain: Initial:     no pain reported at rest Post Session: no pain reported at rest   Medications Last Reviewed:  2020  Updated Objective Findings:  See evaluation    TREATMENT:   Therapeutic Exercise: (30min) (Done in order to improve mobility, strength, function and overall understanding of condition)   Date  20 Date  20 Date  20   Activity/Exercise      Education · Discussed HEP · Discussed HEP · Discussed HEP   Clams  Red band, 10x, 3 sets Blue band, 10x, 3 sets   Side-lying abdcution  No resistance, 10x,3 sets No resistance, 10x,3 sets   Pelvic drops   5x, 2 sets,ipsiateral support   Rhythmic stabilization - - -   Shoulder IR - - -   PNF extension Worked in partial range, used manual resistance, 10x,2 sets Worked in partial range, used manual resistance, 10x,2 sets Worked in partial range, used manual resistance, 10x,2 sets   Forward elevation w/ ER resistance - - -     Manual Therapy: (15 min) (Done to improve mobility, reduce tone, guarding and pain)  · Soft tissue mobilization to glute med and min, piriformis and small rotators at greater trochanter and lateral femur    · Posterior/inferior hip capsule mobilization, Grade IV   · R elbow mobilization into extension, grade IV (NOT DONE TODAY)  · Scapular downward depression facilitation (NOT DONE TODAY)  · Soft tissue mobilization around the scapula (NOT DONE TODAY)    Dry needling (0 min) ( done to improve muscle tone and pain)  (NOT DONE TODAY)  Dry needling to lateral hip and small rotators (glut med, glut min, piriformis, obturator externus): 6 needles total, done with ESTIM        Modalities: (-)  [employer] Portal  Treatment/Session Summary:    · Response to Treatment: Ms. Christina Baer tolerated the session well overall but did have some increased lateral hip and shin pain towards the end of the session. Discussed to avoid standing pelvic drops for the time being. Issued a blue band for clams. We will continue to progress her plan of care. · Communication/Consultation:  None today  · Equipment provided today:  None today  · Recommendations/Intent for next treatment session: Next visit will focus on progressing established plan of care.   Total Treatment Billable Duration:  45 min    PT Patient Time In/Time Out  Time In: 1430  Time Out: 4578    Kandace Marie, PT, DPT

## 2020-06-15 ENCOUNTER — HOSPITAL ENCOUNTER (OUTPATIENT)
Dept: PHYSICAL THERAPY | Age: 65
Discharge: HOME | End: 2020-06-15
Payer: COMMERCIAL

## 2020-06-15 PROCEDURE — 97110 THERAPEUTIC EXERCISES: CPT

## 2020-06-15 PROCEDURE — 20560 NDL INSJ W/O NJX 1 OR 2 MUSC: CPT

## 2020-06-15 PROCEDURE — 97140 MANUAL THERAPY 1/> REGIONS: CPT

## 2020-06-15 NOTE — PROGRESS NOTES
Keena Frazier  : 1955  Primary: Brittny Ynes Ppo  Secondary:  Therapy Center at [facility]  [address] , [address], [place] [address]  [phone]   [phone]        OUTPATIENT PHYSICAL THERAPY: Daily Treatment Note  6/15/2020    9  ICD-10: Treatment Diagnosis: Pain in right hip (M25.551); DIFFICULTY WALKING, NOT ELSEWHERE CLASSIFIED R 26.2; Pain in right elbow (M25.521)     Precautions/Allergies: non reported  Fall Risk Score: 0 (? 5 = High Risk)  MD Orders: evaluate and treat  TREATMENT PLAN:  Effective Dates: 2020 TO 2020 (90 days). Frequency/Duration: 2 times a week for 90 Day(s) MEDICAL/REFERRING DIAGNOSIS:right hip right elbow pain   DATE OF ONSET: surgery date: 2-15-20  REFERRING PHYSICIAN:Debra Rosa [place]  RETURN PHYSICIAN APPOINTMENT           Pre-treatment Symptoms/Complaints:  Reports that she decided to have the MRI done and the results were negative in both hip and back. States her leg does not bother her as much now with walking. Still feels it on occasion with the exercises.    Pain: Initial:     no pain reported at rest Post Session: no pain reported at rest   Medications Last Reviewed:  6/15/2020  Updated Objective Findings:  See evaluation    TREATMENT:   Therapeutic Exercise: (15min) (Done in order to improve mobility, strength, function and overall understanding of condition)   Date  20 Date  20 Date  6-15-20   Activity/Exercise      Education · Discussed HEP · Discussed HEP · Discussed HEP   Clams Red band, 10x, 3 sets Blue band, 10x, 3 sets HEP   Side-lying abdcution No resistance, 10x,3 sets No resistance, 10x,3 sets 10x   Step ups   4\", 5x, 2 sets, ipsilateral support, bilateral   Pelvic drops  5x, 2 sets,ipsiateral support -   Rhythmic stabilization - - -   Shoulder IR - - -   PNF extension Worked in partial range, used manual resistance, 10x,2 sets Worked in partial range, used manual resistance, 10x,2 sets -   Forward elevation w/ ER resistance - - -     Manual Therapy: (15 min) (Done to improve mobility, reduce tone, guarding and pain)  · Soft tissue mobilization to glute med and min, piriformis and small rotators at greater trochanter and lateral femur    · Posterior/inferior hip capsule mobilization, Grade IV   · R elbow mobilization into extension, grade IV (NOT DONE TODAY)  · Scapular downward depression facilitation (NOT DONE TODAY)  · Soft tissue mobilization around the scapula (NOT DONE TODAY)    Dry needling (10 min) ( done to improve muscle tone and pain)  (NOT DONE TODAY)  Dry needling to glut med and min: 3 needles total, done with ESTIM        Modalities: (-)  [employer] Portal  Treatment/Session Summary:    · Response to Treatment: Ms. Cadnice Cohen tolerated the session well. Her glut med and min have much better recruitment after the dry needling. Added step up exercise. Discussed walking progression. Overall progressing well. · Communication/Consultation:  None today  · Equipment provided today:  None today  · Recommendations/Intent for next treatment session: Next visit will focus on progressing established plan of care.   Total Treatment Billable Duration:  45 min    PT Patient Time In/Time Out  Time In: 1115  Time Out: [address], PT, DPT

## 2020-06-18 ENCOUNTER — HOSPITAL ENCOUNTER (OUTPATIENT)
Dept: PHYSICAL THERAPY | Age: 65
Discharge: HOME | End: 2020-06-18
Payer: COMMERCIAL

## 2020-06-18 PROCEDURE — 97110 THERAPEUTIC EXERCISES: CPT

## 2020-06-18 PROCEDURE — 97140 MANUAL THERAPY 1/> REGIONS: CPT

## 2020-06-18 NOTE — THERAPY RECERTIFICATION
Rose Beltre  : 1955  Primary: Jonah Hernandez Ppo  Secondary:  [facility] [place] [address], [address], [place] [address]  [phone]   [phone]         OUTPATIENT PHYSICAL THERAPY:Recertification     ICD-10: Treatment Diagnosis: Pain in right hip (M25.551); DIFFICULTY WALKING, NOT ELSEWHERE CLASSIFIED R 26.2; Pain in right elbow (M25.521)    Precautions/Allergies: non reported  Fall Risk Score: 0 (? 5 = High Risk)  MD Orders: evaluate and treat  TREATMENT PLAN:  Effective Dates: 2020 TO 2020 (90 days). Frequency/Duration: 1-2 times a week for 90 Day(s), depending on her schedule MEDICAL/REFERRING DIAGNOSIS:right hip right elbow pain   DATE OF ONSET: surgery date: 2-15-20  REFERRING PHYSICIAN:Debora Rosa [place]  RETURN PHYSICIAN APPOINTMENT: did not specify       ASSESSMENT:  Ms. Brittany Cooper has come for a total of 10 visits since starting physical therapy on 20. During that time her R hip and lower leg pain have continued to improve. We figured that her shin pain is related to her glut med tendenopathy which we are actively treating. Skilled physical therapy is recommended to continue progressing her plan of care in order to return her to full function with minimal symptoms. PROBLEM LIST (Impacting functional limitations):  1. Decreased hip strength  2. Decreased hip ROM  3. Leg length discrepancy  4. Antalgic gait pattern  5. Decreased elbow ROM  6. Triceps weakness  7. Tightness in scapula-thoracic region   INTERVENTIONS PLANNED:  1. Home Exercise Program (HEP)  2. Manual Therapy  3. Therapeutic Exercise/Strengthening  4. Cryotherapy w/ vaso-pneumatic compression  5. Dry needling      GOALS: (Goals have been discussed and agreed upon with patient.)  SHORT-TERM FUNCTIONAL GOALS: Time Frame: 2-4 wks  1. Patient will report 25-50% reduction in overall symptoms (MET)  2. Patient will be compliant with HEP and plan of care (MET)  3. Patient will be able to perform side-lying abd without increased lower leg pain (MET)  4. Patient will improve on the LEFS by 3-5 points(ongoing)   5  DISCHARGE GOALS: Time Frame: 8-10 wk  1. Patient will report % reduction in overall symptoms in order to be able to have full function with decreased symptoms (ongoing)   2. Patient will report feeling comfortable progressing their own plan of care with the home program prescribed (ongoing)  3. Patient will be able to hike with minimal increase in pain (0-2/10) (ongoing)   4. Patient will improve on the LEFS by 8-10 points in order to demonstrate improved function with less pain (ongoing)     Rehabilitation Potential For Stated Goals: [facility], I certify that the treatment plan above will be carried out by a therapist or under their direction. Thank you for this referral,  Felipa Barajas PT,DPT    Referring Physician Signature: ROBERT Au _____________________________________________________ Date: __________________________________          HISTORY:   History of Present Injury/Illness (Reason for Referral): reports that she had a fall on Feb 15th while playing pickle ball. She went to the ED and found out she had an intertrochanteric fracture. She intermedullary screw placed. She was weight bearing right away. She also had a R olecranon fracture. She was in a 90 deg splint for her left elbow for 6 wks. She still feels less stable on her R leg. She feels pain in the R shin and at times she has foot slap on the R side. All of these symptoms started about 6 wks after the initial injury.    Past Medical History/Comorbidities:      Diagnosis Date    Osteoarthritis of hand     Osteopenia     R wrist fracture      Social History/Living Environment: lives at home with her   Prior Level of Function/Work/Activity:indpendent/ retired   Current Medications:       Current Outpatient Medications:     estradiol (ESTRACE) 0.01 % (0.1 mg/gram) vaginal cream, Insert 1 g into vagina two (2) times a week., Disp: 3 Tube, Rfl: 1    estradiol (IMVEXXY) 10 mcg inst, Insert 1 Insert into vagina two (2) times a week., Disp: 8 Insert, Rfl: 11    ESTRACE 0.01 % (0.1 mg/gram) vaginal cream, , Disp: , Rfl:     CALCIUM CARBONATE (CALCIUM 300 PO), Take 1 Tab by mouth daily. , Disp: , Rfl:     Cholecalciferol, Vitamin D3, (VITAMIN D3) 1,000 unit cap, Take  by mouth., Disp: , Rfl:     ASCORBATE CALCIUM (VITAMIN C PO), Take 1 Tab by mouth daily. , Disp: , Rfl:     FLAXSEED OIL (OMEGA 3 PO), Take 1 Tab by mouth daily. , Disp: , Rfl:     OTHER, Vit k2 liquid once daily, Disp: , Rfl:   Date Last Reviewed: 6/18/2020     Ambulatory/Rehab Services H2 Model Falls Risk Assessment    Risk Factors:       No Risk Factors Identified Ability to Rise from Chair:       (0)  Ability to rise in a single movement    Falls Prevention Plan:       No modifications necessary   Total: (5 or greater = High Risk): 0    ©2010 [facility]. All Rights Reserved. [facility] States Patent #[id number].  Federal Law prohibits the replication, distribution or use without written permission from [employer]      Number of Personal Factors/Comorbidities that affect the Plan of Care: 0: LOW COMPLEXITY   EXAMINATION:   (Abbreviations: P-pain, NP- no pain, wnl-within normal limits)  Observation/Orthostatic Postural Assessment:    Relaxed standing posture:  · L inonimate is higher, weight shift towards R   · Scoliotic curvature noted in upper-mid  thoracic      Palpation/tone/tissue texture:    ASIS: R lower in supine  PSIS: n/a  Leg Length: supine: R shorter in supine        Long Sitting:      Soft tissue:  · Hamstring: wnl  · Hip flexors: wnl  · Adductors :wnl  · Lateral hip: increased tone and tenderness on R side (much improved)      ROM:   Multisegmental ROM Date:  6/18/2020       Flexion Wnl, NP   Extension Wnl, NP   Rotation L wnl   Rotation R wnl   extension (-) bilateral    flexion (-) bilateral    shear Mild mobility loss bilateral, no reproduction of symptoms       Hip ROM Date:  6/18/2020       Right Left   Flexion 105 (springier end feel) wnl   Extension wnl wnl   IR Mild tightness still present wnl   ER wnl wnl     Elbow  ROM Date:  6/18/2020       Right Left   Flexion Mild end range limitation Mild end range limitation   Extension 10 deg flex contracture 0 deg         Strength:     Hip Strength Date:  6/18/2020       Right Left   Flexion 4 5   Extension 4 4+   IR 4 4+   ER 4 4+   Abduction 4+ (no pain) 5      Elbow strength Date:  6/18/2020       Right Left   Flexion 5 5   Extension 4 5             Special Tests:   ALFREDO: R: -     L:-  FADIR:  R: + for compression (@ end range)    L:  (-)    Neurological Screen:   Myotomes:strong in bilateral LE's     Dermatomes: intact in bilateral LE's         Reflexes: n/a         Neural Tension Tests: SLR (-), femoral (-)  Obturator (-)  Functional Mobility:    · Double leg squat: partial   · Single leg squat:  L: n/a        R:n/a  · Gait Pattern: ambulates with increased initial impact on R, decreased mid-terminal stance control on the R side, decreased anterior inonimate elevation on R side (improving)  Balance:  Single leg   L: 5 sec, mild sway  R:5 sec mild sway   CLINICAL DECISION MAKING:   Outcome Measure: Tool Used: Lower Extremity Functional Scale (LEFS)  Score:  Initial: 47/80 Most Recent: 47/80 (Date:6-18-20 )   Interpretation of Score: 20 questions each scored on a 5 point scale with 0 representing \"extreme difficulty or unable to perform\" and 4 representing \"no difficulty\". The lower the score, the greater the functional disability. 80/80 represents no disability. Minimal detectable change is 9 points. Tool Used: Disabilities of the Arm, Shoulder and Hand (DASH) Questionnaire - Quick Version  Score:  Initial: 22/55  Most Recent: X/55 (Date: -- )   Interpretation of Score:  The DASH is designed to measure the activities of daily living in person's with upper extremity dysfunction or pain. Each section is scored on a 1-5 scale, 5 representing the greatest disability. The scores of each section are added together for a total score of 55. Score 11 12-19 20-28 29-37 38-45 46-54 55   Modifier CH CI CJ CK CL CM CN     Medical Necessity:   · Patient is expected to demonstrate progress in strength, range of motion and symptom levels to return to full function. Reason for Services/Other Comments:  · Patient continues to require skilled intervention due to ongoing symptoms. · Pain: Initial: no pain at rest, increased pain in R shoulder with end range movements, at times has pain in the hip in SL stance  Post Session:  Same as initial  ·   Compliance with Program/Exercises: compliant   · Recommendations/Intent for next treatment session: \"Next visit will focus on progressing the patients plan of care\".     Future Appointments   Date Time Provider Poonam Zapata   6/25/2020  9:30 AM Selena Gill, PT, DPT [facility]   6/29/2020 10:30 AM Selena Gill, PT, DPT [facility]   7/2/2020 10:15 AM [facility]   7/6/2020 11:15 AM Selena Gill PT, DPT [facility]     Total Treatment Duration:45 min, parvin 5 min  PT Patient Time In/Time Out  Time In: 0845  Time Out: 0935      Josse Smart, PT, DPT

## 2020-06-18 NOTE — PROGRESS NOTES
Dave Christie  : 1955  Primary: Chidi Shay Ppo  Secondary:  [facility], [address], [place] [address]  [phone]   [phone]        OUTPATIENT PHYSICAL THERAPY: Daily Treatment Note  2020    10  ICD-10: Treatment Diagnosis: Pain in right hip (M25.551); DIFFICULTY WALKING, NOT ELSEWHERE CLASSIFIED R 26.2; Pain in right elbow (M25.521)     Precautions/Allergies: non reported  Fall Risk Score: 0 (? 5 = High Risk)  MD Orders: evaluate and treat  TREATMENT PLAN:  Effective Dates: 2020 TO 2020 (90 days). Frequency/Duration: 2 times a week for 90 Day(s) MEDICAL/REFERRING DIAGNOSIS:right hip right elbow pain   DATE OF ONSET: surgery date: 2-15-20  REFERRING PHYSICIAN:Shiela Rosa, [address]  RETURN PHYSICIAN APPOINTMENT           Pre-treatment Symptoms/Complaints:  Reports that she was able to walk 20 minutes without increased shin pain. Feels some hip discomfort at times.   Pain: Initial:     no pain reported at rest Post Session: no pain reported at rest   Medications Last Reviewed:  2020  Updated Objective Findings:  See evaluation    TREATMENT:   Therapeutic Exercise: (15min) (Done in order to improve mobility, strength, function and overall understanding of condition)   Date  20 Date  6-15-20 Date  20   Activity/Exercise      Education · Discussed HEP · Discussed HEP · Discussed HEP   Clams Blue band, 10x, 3 sets HEP HEP   Side-lying abdcution No resistance, 10x,3 sets 10x HEP   Step ups  4\", 5x, 2 sets, ipsilateral support, bilateral HEP   Pelvic drops 5x, 2 sets,ipsiateral support - -   Rhythmic stabilization - - -   Shoulder IR - - -   PNF extension Worked in partial range, used manual resistance, 10x,2 sets - 10x, worked through full range   Forward elevation w/ ER resistance - - HEP   Thoracic rotation   10x, bilateral    Thoracic foam roll   Reviewed and demonstrated      Manual Therapy: (30 min) (Done to improve mobility, reduce tone, guarding and pain)  · Soft tissue mobilization to glute med and min, piriformis and small rotators at greater trochanter and lateral femur   (NOT DONE TODAY)  · Posterior/inferior hip capsule mobilization, Grade IV   · R elbow mobilization into extension, grade IV (NOT DONE TODAY)  · T1-T4 mobilization  · Rib springing at ribs 2-4 on R side  · Soft tissue mobilization in R subscapularis and lat    Dry needling (min) ( done to improve muscle tone and pain)  (NOT DONE TODAY)  Dry needling to glut med and min: 3 needles total, done with ESTIM        Modalities: (-)  [employer] Portal  Treatment/Session Summary:    · Response to Treatment: Ms. Sandrine Dye tolerated the session relatively well. Her hip continues to get stronger and we are progressing with endurance. Her R shoulder continues to bother her so we worked more on her thoracic spine and rib cage as well as shoulder stability. Discussed her home program.   · Communication/Consultation:  None today  · Equipment provided today:  None today  · Recommendations/Intent for next treatment session: Next visit will focus on progressing established plan of care.   Total Treatment Billable Duration:  45 min, re-eval 5 min    PT Patient Time In/Time Out  Time In: 0845  Time Out: 0978    Vipul Torres, PT, DPT

## 2020-06-25 ENCOUNTER — HOSPITAL ENCOUNTER (OUTPATIENT)
Dept: PHYSICAL THERAPY | Age: 65
Discharge: HOME | End: 2020-06-25
Payer: COMMERCIAL

## 2020-06-25 PROCEDURE — 97110 THERAPEUTIC EXERCISES: CPT

## 2020-06-25 PROCEDURE — 97140 MANUAL THERAPY 1/> REGIONS: CPT

## 2020-06-25 NOTE — PROGRESS NOTES
Filipino  : 1955  Primary: [place] [address] Ppo  Secondary:  [facility]  [facility], [address], [place] [address]  [phone]   [phone]        OUTPATIENT PHYSICAL THERAPY: Daily Treatment Note  2020    11  ICD-10: Treatment Diagnosis: Pain in right hip (M25.551); DIFFICULTY WALKING, NOT ELSEWHERE CLASSIFIED R 26.2; Pain in right elbow (M25.521)     Precautions/Allergies: non reported  Fall Risk Score: 0 (? 5 = High Risk)  MD Orders: evaluate and treat  TREATMENT PLAN:  Effective Dates: 2020 TO 2020 (90 days). Frequency/Duration: 2 times a week for 90 Day(s) MEDICAL/REFERRING DIAGNOSIS:right hip right elbow pain   DATE OF ONSET: surgery date: 2-15-20  REFERRING PHYSICIAN:Elisha Rosa, [address]  RETURN PHYSICIAN APPOINTMENT           Pre-treatment Symptoms/Complaints:  Reports that she has had a bad week so far. States she has tried to push herself with the exercises and may have over done it.   Pain: Initial:     mild hip pain at rest Post Session: no pain reported at rest   Medications Last Reviewed:  2020  Updated Objective Findings:  See evaluation    TREATMENT:   Therapeutic Exercise: (20min) (Done in order to improve mobility, strength, function and overall understanding of condition)   Date  6-15-20 Date  20 Date  20   Activity/Exercise      Education · Discussed HEP · Discussed HEP · Discussed importance of understanding cumulative load on the hip   Clams HEP HEP Not today   Side-lying abdcution 10x HEP Not today   Step ups 4\", 5x, 2 sets, ipsilateral support, bilateral HEP Not today   PNF extension - 10x, worked through full range Reviewed    [employer], 3lbs, 10x   Forward elevation w/ ER resistance - HEP HEP   Thoracic rotation  10x, bilateral  10x, 2 sets bilateral, reviewed proper form   Thoracic foam roll  Reviewed and demonstrated  HEP     Manual Therapy: (25 min) (Done to improve mobility, reduce tone, guarding and pain)  · Soft tissue mobilization to glute med and min, piriformis and small rotators at greater trochanter and lateral femur     · Posterior/inferior hip capsule mobilization, Grade IV   · R elbow mobilization into extension, grade IV (NOT DONE TODAY)  · Rib springing at ribs 2-4 on R side  · Soft tissue mobilization in R subscapularis and lat (NOT DONE TODAY)        Modalities: (-)  [employer] Portal  Treatment/Session Summary:    · Response to Treatment: Ms. Norma Moncada had increased inhibition of her hip abductors today. After some manual therapy, the strength improved. Discussed to rest from the exercises for 1-2 days and then return to them but not to exceed her limits. Also added fly's for anterior shoulder stability. · Communication/Consultation:  None today  · Equipment provided today:  None today  · Recommendations/Intent for next treatment session: Next visit will focus on progressing established plan of care.   Total Treatment Billable Duration:  Manual-25 min  TE-20 min    PT Patient Time In/Time Out  Time In: 0930  Time Out: 1015    Carlitos Mohamud PT, DPT

## 2020-07-02 ENCOUNTER — APPOINTMENT (OUTPATIENT)
Dept: PHYSICAL THERAPY | Age: 65
End: 2020-07-02
Payer: COMMERCIAL

## 2020-07-06 ENCOUNTER — APPOINTMENT (OUTPATIENT)
Dept: PHYSICAL THERAPY | Age: 65
End: 2020-07-06
Payer: COMMERCIAL

## 2020-07-09 ENCOUNTER — HOSPITAL ENCOUNTER (OUTPATIENT)
Dept: PHYSICAL THERAPY | Age: 65
Discharge: HOME | End: 2020-07-09
Payer: COMMERCIAL

## 2020-07-09 PROCEDURE — 97140 MANUAL THERAPY 1/> REGIONS: CPT

## 2020-07-09 PROCEDURE — 97110 THERAPEUTIC EXERCISES: CPT

## 2020-07-09 NOTE — PROGRESS NOTES
Cl Ta  : 1955  Primary: Colt Magallanes Ppo  Secondary:  [facility]  [facility], [address], [place] [address]  [phone]   [phone]        OUTPATIENT PHYSICAL THERAPY: Daily Treatment Note  2020    12  ICD-10: Treatment Diagnosis: Pain in right hip (M25.551); DIFFICULTY WALKING, NOT ELSEWHERE CLASSIFIED R 26.2; Pain in right elbow (M25.521)     Precautions/Allergies: non reported  Fall Risk Score: 0 (? 5 = High Risk)  MD Orders: evaluate and treat  TREATMENT PLAN:  Effective Dates: 2020 TO 2020 (90 days). Frequency/Duration: 2 times a week for 90 Day(s) MEDICAL/REFERRING DIAGNOSIS:right hip right elbow pain   DATE OF ONSET: surgery date: 2-15-20  REFERRING PHYSICIAN:Sarabjit Rosa, [address]  RETURN PHYSICIAN APPOINTMENT           Pre-treatment Symptoms/Complaints:  Reports that the hip is progressing slowly. States she still feels it but with longer walks and the pain is usually more proximal now. The shoulder however still bothers her about the same and wonders if she should get imaging.    Pain: Initial:     mild hip pain at rest Post Session: no pain reported at rest   Medications Last Reviewed:  2020  Updated Objective Findings:    · Hip abd strength: R 28lbs   L: 38lbs   · R shoulder IR: R: 50 deg  L: 70 deg     TREATMENT:   Therapeutic Exercise: (20min) (Done in order to improve mobility, strength, function and overall understanding of condition)   Date  20 Date  20 Date  20   Activity/Exercise      Education · Discussed HEP · Discussed importance of understanding cumulative load on the hip · Discussed updated HEP  · Educated how to perform self hip mobilization with adduction component in quadruped   Clams HEP Not today reviewed   Side-lying abdcution HEP Not today reviewed   Step ups HEP Not today reviewed   PNF extension 10x, worked through full range Reviewed     [employer], 3lbs, 10x Discussed Forward elevation w/ ER resistance HEP HEP HEP   Thoracic rotation 10x, bilateral  10x, 2 sets bilateral, reviewed proper form HEP   Thoracic foam roll Reviewed and demonstrated  HEP HEP   Posterior capsule shoulder stretch   · 30 sec, 3x in supine and standing  · Cross adduction: 30 sec     Manual Therapy: (35 min) (Done to improve mobility, reduce tone, guarding and pain)  · Soft tissue mobilization to glute med and min tendon   · Posterior/inferior hip capsule mobilization done with leg straight, adduction and IR, Grade IV   · Posterior shoulder capsule mobilization, grade IV, done with sleeper stretch and cross adduction position  · R elbow mobilization into extension, grade IV (NOT DONE TODAY)  · Rib springing at ribs 2-4 on R side (not done today)  · Soft tissue mobilization in R subscapularis and lat (NOT DONE TODAY)        Modalities: (-)  [facility]  Treatment/Session Summary:    · Response to Treatment: Ms. Zena Murphy had improved hip mobility after the session today. We also found a way to measure her hip strength more objectively which is a good way to track progress. · Communication/Consultation:  None today  · Equipment provided today:  None today  · Recommendations/Intent for next treatment session: Next visit will focus on progressing established plan of care.   Total Treatment Billable Duration:  Manual-35 min  TE-20 min    PT Patient Time In/Time Out  Time In: 1100  Time Out: 6371-B Anthony López, PT, DPT

## 2020-07-16 ENCOUNTER — APPOINTMENT (OUTPATIENT)
Dept: PHYSICAL THERAPY | Age: 65
End: 2020-07-16
Payer: COMMERCIAL

## 2020-07-20 ENCOUNTER — HOSPITAL ENCOUNTER (OUTPATIENT)
Dept: PHYSICAL THERAPY | Age: 65
Discharge: HOME | End: 2020-07-20
Payer: COMMERCIAL

## 2020-07-20 PROCEDURE — 97110 THERAPEUTIC EXERCISES: CPT

## 2020-07-20 PROCEDURE — 97140 MANUAL THERAPY 1/> REGIONS: CPT

## 2020-07-20 NOTE — PROGRESS NOTES
Ronni Bennett  : 1955  Primary: Gonzalez Marking Ppo  Secondary:  [facility]  [address] , [address], [place] [address]  [phone]   [phone]        OUTPATIENT PHYSICAL THERAPY: Daily Treatment Note  2020    13  ICD-10: Treatment Diagnosis: Pain in right hip (M25.551); DIFFICULTY WALKING, NOT ELSEWHERE CLASSIFIED R 26.2; Pain in right elbow (M25.521)     Precautions/Allergies: non reported  Fall Risk Score: 0 (? 5 = High Risk)  MD Orders: evaluate and treat  TREATMENT PLAN:  Effective Dates: 2020 TO 2020 (90 days). Frequency/Duration: 2 times a week for 90 Day(s) MEDICAL/REFERRING DIAGNOSIS:right hip right elbow pain   DATE OF ONSET: surgery date: 2-15-20  REFERRING PHYSICIAN:Rahat Rosa [place]  RETURN PHYSICIAN APPOINTMENT           Pre-treatment Symptoms/Complaints:  Reports that she was doing well but then had moderate discomfort after working out at sports club. This lasted several days but feels better today.   Pain: Initial:     mild hip pain at rest Post Session: no pain reported at rest   Medications Last Reviewed:  2020  Updated Objective Findings:    · Hip abd strength: R 26lbs   L: 36lbs      TREATMENT:   Therapeutic Exercise: (10min) (Done in order to improve mobility, strength, function and overall understanding of condition)   Date  20 Date  20 Date  20   Activity/Exercise      Education · Discussed importance of understanding cumulative load on the hip · Discussed updated HEP  · Educated how to perform self hip mobilization with adduction component in quadruped · Discussed HEP   Clams Not today reviewed HEP   Side-lying abdcution Not today reviewed HEP   Step ups Not today reviewed HEP   PNF extension Reviewed      [employer] machine, 3lbs, 10x Discussed HEP   Forward elevation w/ ER resistance HEP HEP -   Thoracic rotation 10x, 2 sets bilateral, reviewed proper form HEP -   Thoracic foam roll HEP HEP Posterior capsule shoulder stretch  · 30 sec, 3x in supine and standing  · Cross adduction: 30 sec · Cross adduction, 30 sec   End range hip flexion stabilization   ·  Flex/adduction; 10 sec hold, 5x     Manual Therapy: (35 min) (Done to improve mobility, reduce tone, guarding and pain)  · Soft tissue mobilization to glute med and min tendon (not done today)  · Posterior/inferior hip capsule mobilization done with leg straight, adduction and IR, Grade IV   · Posterior shoulder capsule soft tissue mobilization, grade IV, done with cross adduction position  · R elbow mobilization into extension, grade IV (NOT DONE TODAY)  · Soft tissue mobilization in R subscapularis and lat (NOT DONE TODAY)        Modalities: (-)  [employer] Portal  Treatment/Session Summary:    · Response to Treatment: Ms. John Del Cid had increased hip tightness today. This improved with manual treatment. Discussed to work more on mobility at home and to continue challenging the tendon. Also discussed working more frequently on her shoulder posterior capsule  · Communication/Consultation:  None today  · Equipment provided today:  None today  · Recommendations/Intent for next treatment session: Next visit will focus on progressing established plan of care.   Total Treatment Billable Duration:  Manual-35 min  TE-10 min    PT Patient Time In/Time Out  Time In: 5861  Time Out: 2323    Tuan Gaona, PT, DPT

## 2020-07-23 ENCOUNTER — APPOINTMENT (OUTPATIENT)
Dept: PHYSICAL THERAPY | Age: 65
End: 2020-07-23
Payer: COMMERCIAL

## 2020-07-27 ENCOUNTER — HOSPITAL ENCOUNTER (OUTPATIENT)
Dept: PHYSICAL THERAPY | Age: 65
Discharge: HOME | End: 2020-07-27
Payer: COMMERCIAL

## 2020-07-27 PROCEDURE — 97140 MANUAL THERAPY 1/> REGIONS: CPT

## 2020-07-27 PROCEDURE — 97110 THERAPEUTIC EXERCISES: CPT

## 2020-07-30 ENCOUNTER — APPOINTMENT (OUTPATIENT)
Dept: PHYSICAL THERAPY | Age: 65
End: 2020-07-30
Payer: COMMERCIAL

## 2020-08-03 ENCOUNTER — HOSPITAL ENCOUNTER (OUTPATIENT)
Dept: PHYSICAL THERAPY | Age: 65
Discharge: HOME | End: 2020-08-03
Payer: COMMERCIAL

## 2020-08-03 PROCEDURE — 97140 MANUAL THERAPY 1/> REGIONS: CPT

## 2020-08-03 PROCEDURE — 97110 THERAPEUTIC EXERCISES: CPT

## 2020-08-03 NOTE — PROGRESS NOTES
Ronni Bennett  : 1955  Primary: [employer] Ppo  Secondary:  [facility]  [facility] [address], [place] [address]  [phone]   [phone]        OUTPATIENT PHYSICAL THERAPY: Daily Treatment Note  8/3/2020    15  ICD-10: Treatment Diagnosis: Pain in right hip (M25.551); DIFFICULTY WALKING, NOT ELSEWHERE CLASSIFIED R 26.2; Pain in right elbow (M25.521)     Precautions/Allergies: non reported  Fall Risk Score: 0 (? 5 = High Risk)  MD Orders: evaluate and treat  TREATMENT PLAN:  Effective Dates: 2020 TO 2020 (90 days). Frequency/Duration: 2 times a week for 90 Day(s) MEDICAL/REFERRING DIAGNOSIS:right hip right elbow pain   DATE OF ONSET: surgery date: 2-15-20  REFERRING PHYSICIAN:Rahat Rosa, [address]  RETURN PHYSICIAN APPOINTMENT           Pre-treatment Symptoms/Complaints:  Reports that she has had good and bad days. She was able to hike 4 miles with minimal symptoms but other days she had some increased pain.    Pain: Initial:     no pain at rest but able to reproduce it with hip flexion and adduction in side-lying Post Session: no pain reported at rest   Medications Last Reviewed:  8/3/2020  Updated Objective Findings:        TREATMENT:   Therapeutic Exercise: (15min) (Done in order to improve mobility, strength, function and overall understanding of condition)   Date  20 Date  20 Date  20 Date  8-3-20   Activity/Exercise       Education · Discussed updated HEP  · Educated how to perform self hip mobilization with adduction component in quadruped · Discussed HEP · Discussed updated HEP · Discussed HEP   Clams reviewed HEP HEP HEP   Side-lying abduction reviewed HEP HEP HEP   Step ups reviewed HEP HEP HEP   PNF extension    -   Fly's Discussed HEP HEP    Forward elevation w/ ER resistance HEP - HEP HEP   Thoracic foam roll HEP  -    Posterior capsule shoulder stretch · 30 sec, 3x in supine and standing  · Cross adduction: 30 sec · Cross adduction, 30 sec · Cross adduction, 30 sec, 2 sets · Cross adduction, 30 sec, 2 sets   End range hip flexion stabilization  ·  Flex/adduction; 10 sec hold, 5x Flex/adduction; 10 sec hold, 5x Flex/adduction; 10 sec hold, 5x   Posterior inonimate rotation  ·   Self MET Self MET   Hip flexor stretch ·   · Half kneeling-reviewed  · 60 sec in modified giovani position · Reviewed    Sciatic nerve glide ·   ·  90/90 position with adduction, 10x     Manual Therapy: (35 min) (Done to improve mobility, reduce tone, guarding and pain)  · Soft tissue mobilization to glute med and min tendon (not done today)  · R inonimate posterior rotation, MET  · Posterior/inferior hip capsule mobilization done with leg straight, adduction and IR, Grade IV (not done today)  · Posterior shoulder capsule soft tissue mobilization, grade IV, done with cross adduction position  · R TFL, glut med tendon, IT band fascia and scar soft tissue mobilization/release  · R elbow mobilization into extension, grade IV (NOT DONE TODAY)          Modalities: (-)  [facility] Portal  Treatment/Session Summary:    · Response to Treatment: Ms. Stacie Zheng tolerated the session well. She had moderate tenderness at the IT band and scar. We worked on this for some time but she was still able to reproduce her symptoms with side-lying hip adduction and flexion but only actively but not passively. Discussed her home program.  · Communication/Consultation:  None today  · Equipment provided today:  None today  · Recommendations/Intent for next treatment session: Next visit will focus on progressing established plan of care.   Total Treatment Billable Duration:  Manual-35 min  TE-15 min    PT Patient Time In/Time Out  Time In: 5770  Time Out: Collin. 15, PT, DPT

## 2020-08-10 ENCOUNTER — HOSPITAL ENCOUNTER (OUTPATIENT)
Dept: PHYSICAL THERAPY | Age: 65
Discharge: HOME | End: 2020-08-10
Payer: COMMERCIAL

## 2020-08-10 PROCEDURE — 97140 MANUAL THERAPY 1/> REGIONS: CPT

## 2020-08-10 PROCEDURE — 97110 THERAPEUTIC EXERCISES: CPT

## 2020-08-10 NOTE — PROGRESS NOTES
Rafa Walker  : 1955  Primary: Tye Walker Ppo  Secondary:  [facility]  [address] , [address], [place] [address]  [phone]   [phone]        OUTPATIENT PHYSICAL THERAPY: Daily Treatment Note  8/10/2020    16  ICD-10: Treatment Diagnosis: Pain in right hip (M25.551); DIFFICULTY WALKING, NOT ELSEWHERE CLASSIFIED R 26.2; Pain in right elbow (M25.521)     Precautions/Allergies: non reported  Fall Risk Score: 0 (? 5 = High Risk)  MD Orders: evaluate and treat  TREATMENT PLAN:  Effective Dates: 2020 TO 2020 (90 days). Frequency/Duration: 2 times a week for 90 Day(s) MEDICAL/REFERRING DIAGNOSIS:right hip right elbow pain   DATE OF ONSET: surgery date: 2-15-20  REFERRING PHYSICIAN:Simon Rosa [place]  RETURN PHYSICIAN APPOINTMENT           Pre-treatment Symptoms/Complaints:  Reports that she has had good and bad days. She was able to hike 4 miles with minimal symptoms but other days she had some increased pain.    Pain: Initial:   Pain Intensity 1: 0 no pain at rest but able to reproduce it with hip flexion and adduction in side-lying Post Session: 0/10 o pain reported at rest   Medications Last Reviewed:  8/10/2020  Updated Objective Findings:        TREATMENT:   Therapeutic Exercise: (10 min) (Done in order to improve mobility, strength, function and overall understanding of condition)   Date  20 Date  20 Date  20 Date  8-3-20 Date:  8/10/20   Activity/Exercise        Education · Discussed updated HEP  · Educated how to perform self hip mobilization with adduction component in quadruped · Discussed HEP · Discussed updated HEP · Discussed HEP ·    Clams reviewed HEP HEP HEP    Side-lying abduction reviewed HEP HEP HEP    Step ups reviewed HEP HEP HEP    PNF extension    -    Fly's Discussed HEP HEP     Forward elevation w/ ER resistance HEP - HEP HEP    Thoracic foam roll HEP  -     Posterior capsule shoulder stretch · 30 sec, 3x in supine and standing  · Cross adduction: 30 sec · Cross adduction, 30 sec · Cross adduction, 30 sec, 2 sets · Cross adduction, 30 sec, 2 sets · Cross adduction, 30 sec, 3 sets   End range hip flexion stabilization  ·  Flex/adduction; 10 sec hold, 5x Flex/adduction; 10 sec hold, 5x Flex/adduction; 10 sec hold, 5x    Posterior inonimate rotation  ·   Self MET Self MET    Hip flexor stretch ·   · Half kneeling-reviewed  · 60 sec in modified giovani position · Reviewed  ·    Sciatic nerve glide ·   ·  90/90 position with adduction, 10x    Shoulder AROM ·   ·   IR, extension 10x each motion working end range   Biceps stretch ·   ·   10x R UE     Manual Therapy: (30 min) (Done to improve mobility, reduce tone, guarding and pain)  · Soft tissue mobilization to glute med and min tendon (not done today)  · R inonimate posterior rotation, MET (not done today)  · Posterior/inferior hip capsule mobilization done with leg straight, adduction and IR, Grade IV (not done today)  · Posterior shoulder capsule soft tissue mobilization, grade IV, done with cross adduction position   · R TFL, glut med tendon, IT band fascia and scar soft tissue mobilization/release (not done today)  · R elbow mobilization into extension, grade IV   · R upper trap STM in supine   · R biceps STM in supine      Modalities: (5) - moist heat to R shoulder in supine    Med[facility] Portal  Treatment/Session Summary:    · Response to Treatment: Ms. Ranjana Chiu tolerated the session well and reported she really liked the manual and felt it helped today. She had improvements in pain free ROM and overall improvements in motions, especially w/ IR. · Communication/Consultation:  None today  · Equipment provided today:  None today  · Recommendations/Intent for next treatment session: Next visit will focus on progressing established plan of care.   Total Treatment Billable Duration:  Manual-30 min  TE-10 min    PT Patient Time In/Time Out  Time In: 3703  Time Out: [address] [facility]

## 2020-08-17 ENCOUNTER — HOSPITAL ENCOUNTER (OUTPATIENT)
Dept: PHYSICAL THERAPY | Age: 65
Discharge: HOME | End: 2020-08-17
Payer: COMMERCIAL

## 2020-08-17 PROCEDURE — 97140 MANUAL THERAPY 1/> REGIONS: CPT

## 2020-08-17 PROCEDURE — 97016 VASOPNEUMATIC DEVICE THERAPY: CPT

## 2020-08-17 PROCEDURE — 97110 THERAPEUTIC EXERCISES: CPT

## 2020-08-17 NOTE — PROGRESS NOTES
Brenna Mobley  : 1955  Primary: Enzo Varma Ppo  Secondary:  [facility]rapy [facility] at [facility]  [address] , [address], [place] [address]  [phone]   [phone]        OUTPATIENT PHYSICAL THERAPY: Daily Treatment Note  2020  ICD-10: Treatment Diagnosis: Pain in right hip (M25.551); DIFFICULTY WALKING, NOT ELSEWHERE CLASSIFIED R 26.2; Pain in right elbow (M25.521)     Precautions/Allergies: non reported  Fall Risk Score: 0 (? 5 = High Risk)  MD Orders: evaluate and treat  TREATMENT PLAN:  Effective Dates: 2020 TO 2020 (90 days). Frequency/Duration: 2 times a week for 90 Day(s) MEDICAL/REFERRING DIAGNOSIS:right hip right elbow pain   DATE OF ONSET: surgery date: 2-15-20  REFERRING PHYSICIAN:Luis M Rosa [place]  RETURN PHYSICIAN APPOINTMENT           Pre-treatment Symptoms/Complaints:  Reports that her shoulder felt great for 2 days then started bothering her again.    Pain: Initial:     no pain at rest but able to reproduce it with hip flexion and adduction in side-lying Post Session: 0/10 o pain reported at rest   Medications Last Reviewed:  2020  Updated Objective Findings:        TREATMENT:   Therapeutic Exercise: (15 min) (Done in order to improve mobility, strength, function and overall understanding of condition)   Date  20 Date  20 Date  20 Date  8-3-20 Date:  8/10/20 Date:  20   Activity/Exercise      Parameters   Education · Discussed updated HEP  · Educated how to perform self hip mobilization with adduction component in quadruped · Discussed HEP · Discussed updated HEP · Discussed HEP ·  ·    UBE      2/2 2.0   Clams reviewed HEP HEP HEP     Side-lying abduction reviewed HEP HEP HEP     Step ups reviewed HEP HEP HEP     PNF extension    -     Fly's Discussed HEP HEP      Forward elevation w/ ER resistance HEP - HEP HEP     Thoracic foam roll HEP  -   Thoracic extension 10x in chair   Posterior capsule shoulder stretch · 30 sec, 3x in supine and standing  · Cross adduction: 30 sec · Cross adduction, 30 sec · Cross adduction, 30 sec, 2 sets · Cross adduction, 30 sec, 2 sets · Cross adduction, 30 sec, 3 sets · Cross adduction 30 sec x3 sets   End range hip flexion stabilization  ·  Flex/adduction; 10 sec hold, 5x Flex/adduction; 10 sec hold, 5x Flex/adduction; 10 sec hold, 5x     Posterior inonimate rotation  ·   Self MET Self MET     Hip flexor stretch ·   · Half kneeling-reviewed  · 60 sec in modified giovani position · Reviewed  ·  ·    Sciatic nerve glide ·   ·  90/90 position with adduction, 10x     Shoulder AROM ·   ·   IR, extension 10x each motion working end range IR, extension 10x each working end range of motion   Biceps stretch ·   ·   10x R UE      Manual Therapy: (30 min) (Done to improve mobility, reduce tone, guarding and pain)  · Soft tissue mobilization to glute med and min tendon (not done today)  · R inonimate posterior rotation, MET (not done today)  · Posterior/inferior hip capsule mobilization done with leg straight, adduction and IR, Grade IV (not done today)  · Posterior shoulder capsule soft tissue mobilization, grade IV, done with cross adduction position   · R TFL, glut med tendon, IT band fascia and scar soft tissue mobilization/release (not done today)  · R elbow mobilization into extension, grade IV (not today)   · R upper trap STM in supine   · R biceps STM in supine (not today)  · Posterior shoulder capsule STM      Modalities: (10) -vasopneumatic compression to R shoulder in sit, 34*, medium compression    Med[facility] Portal  Treatment/Session Summary:    · Response to Treatment: Ms. Carina Arcos tolerated the session well. STM and stretches seem to improve her AROM. Assess carry over next session. · Communication/Consultation:  None today  · Equipment provided today:  None today  · Recommendations/Intent for next treatment session: Next visit will focus on progressing established plan of care.   Total Treatment Billable Duration:  Manual-30 min, TE-15 min, VASO 10 minutes    PT Patient Time In/Time Out  Time In: 1712  Time Out: [address], PT

## 2020-08-24 ENCOUNTER — HOSPITAL ENCOUNTER (OUTPATIENT)
Dept: PHYSICAL THERAPY | Age: 65
Discharge: HOME | End: 2020-08-24
Payer: COMMERCIAL

## 2020-08-24 PROCEDURE — 97140 MANUAL THERAPY 1/> REGIONS: CPT

## 2020-08-24 NOTE — THERAPY RECERTIFICATION
Yuliana Comer  : 1955  Primary: [employer] Ppo  Secondary:  [facility]  [facility] [address], [place] [address]  [phone]   [phone]         OUTPATIENT PHYSICAL THERAPY:Recertification 1055    ICD-10: Treatment Diagnosis: Pain in right hip (M25.551); DIFFICULTY WALKING, NOT ELSEWHERE CLASSIFIED R 26.2; Pain in right elbow (M25.521)    Precautions/Allergies: non reported  Fall Risk Score: 0 (? 5 = High Risk)  MD Orders: evaluate and treat  TREATMENT PLAN:  Effective Dates: 2020 TO 2020 (90 days). Frequency/Duration: 1-2 times a week for 90 Day(s), depending on her schedule MEDICAL/REFERRING DIAGNOSIS:right hip right elbow pain   DATE OF ONSET: surgery date: 2-15-20  REFERRING PHYSICIAN:Watson Rosa [place]  RETURN PHYSICIAN APPOINTMENT: did not specify       ASSESSMENT:  Ms. Roberta Lee has come for a total of 18 visits since starting physical therapy on 20. During that time she has made great overall improvements and has been able to return to hiking but she still has some weakness and has a harder time with hiking on elevations. Skilled physical therapy is recommended to continue progressing her plan of care in order to return her to full function with minimal symptoms. PROBLEM LIST (Impacting functional limitations):  1. Decreased hip strength  2. Decreased hip ROM  3. Leg length discrepancy  4. Antalgic gait pattern  5. Decreased elbow ROM  6. Triceps weakness  7. Tightness in scapula-thoracic region   INTERVENTIONS PLANNED:  1. Home Exercise Program (HEP)  2. Manual Therapy  3. Therapeutic Exercise/Strengthening  4. Cryotherapy w/ vaso-pneumatic compression  5. Dry needling      GOALS: (Goals have been discussed and agreed upon with patient.)  SHORT-TERM FUNCTIONAL GOALS: Time Frame: 2-4 wks  1. Patient will report 25-50% reduction in overall symptoms (MET)  2. Patient will be compliant with HEP and plan of care (MET)  3. Patient will be able to perform side-lying abd without increased lower leg pain (MET)  4. Patient will improve on the LEFS by 3-5 points(ongoing)   5  DISCHARGE GOALS: Time Frame: 8-10 wk  1. Patient will report % reduction in overall symptoms in order to be able to have full function with decreased symptoms (ongoing)   2. Patient will report feeling comfortable progressing their own plan of care with the home program prescribed (ongoing)  3. Patient will be able to hike with minimal increase in pain (0-2/10) (ongoing)   4. Patient will improve on the LEFS by 8-10 points in order to demonstrate improved function with less pain (ongoing)     Rehabilitation Potential For Stated Goals[place], I certify that the treatment plan above will be carried out by a therapist or under their direction. Thank you for this referral,  Brissa Porras PT,DPT    Referring Physician Signature: ROBERT Camejo _____________________________________________________ Date: __________________________________          HISTORY:   History of Present Injury/Illness (Reason for Referral): reports that she had a fall on Feb 15th while playing pickle ball. She went to the ED and found out she had an intertrochanteric fracture. She intermedullary screw placed. She was weight bearing right away. She also had a R olecranon fracture. She was in a 90 deg splint for her left elbow for 6 wks. She still feels less stable on her R leg. She feels pain in the R shin and at times she has foot slap on the R side. All of these symptoms started about 6 wks after the initial injury.    Past Medical History/Comorbidities:      Diagnosis Date    Osteoarthritis of hand     Osteopenia     R wrist fracture      Social History/Living Environment: lives at home with her   Prior Level of Function/Work/Activity:indpendent/ retired   Current Medications:       Current Outpatient Medications:     estradiol (ESTRACE) 0.01 % (0.1 mg/gram) vaginal cream, Insert 1 g into vagina two (2) times a week., Disp: 3 Tube, Rfl: 1    estradiol (IMVEXXY) 10 mcg inst, Insert 1 Insert into vagina two (2) times a week., Disp: 8 Insert, Rfl: 11    ESTRACE 0.01 % (0.1 mg/gram) vaginal cream, , Disp: , Rfl:     CALCIUM CARBONATE (CALCIUM 300 PO), Take 1 Tab by mouth daily. , Disp: , Rfl:     Cholecalciferol, Vitamin D3, (VITAMIN D3) 1,000 unit cap, Take  by mouth., Disp: , Rfl:     ASCORBATE CALCIUM (VITAMIN C PO), Take 1 Tab by mouth daily. , Disp: , Rfl:     FLAXSEED OIL (OMEGA 3 PO), Take 1 Tab by mouth daily. , Disp: , Rfl:     OTHER, Vit k2 liquid once daily, Disp: , Rfl:   Date Last Reviewed: 8/24/2020     Ambulatory/Rehab Services H2 Model Falls Risk Assessment    Risk Factors:       No Risk Factors Identified Ability to Rise from Chair:       (0)  Ability to rise in a single movement    Falls Prevention Plan:       No modifications necessary   Total: (5 or greater = High Risk): 0    ©2010 [facility] [place]. All Rights Reserved. [facility] States Patent #[id number].  Federal Law prohibits the replication, distribution or use without written permission from [facility] [email or web address]N      Number of Personal Factors/Comorbidities that affect the Plan of Care: 0: LOW COMPLEXITY   EXAMINATION:   (Abbreviations: P-pain, NP- no pain, wnl-within normal limits)  Observation/Orthostatic Postural Assessment:    Relaxed standing posture:  · L inonimate is higher, weight shift towards R   · Scoliotic curvature noted in upper-mid  thoracic      Palpation/tone/tissue texture:    ASIS: R lower in supine  PSIS: n/a  Leg Length: supine: R shorter in supine        Long Sitting:      Soft tissue:  · Hamstring: wnl  · Hip flexors: wnl  · Adductors :wnl  · Lateral hip: increased tone and tenderness in R glut med tendon today      ROM:   Multisegmental ROM Date:  8/24/2020       Flexion Wnl, NP   Extension Wnl, NP   Rotation L wnl   Rotation R wnl   extension (-) bilateral    flexion (-) bilateral    shear Mild mobility loss bilateral, no reproduction of symptoms      Shoulder IR: R: 25 deg   L 50 deg Hip ROM Date:  8/24/2020       Right Left   Flexion 110 (springier end feel) wnl   Extension wnl wnl   IR Mild tightness still present wnl   ER wnl wnl     Elbow  ROM Date:  8/24/2020       Right Left   Flexion Mild end range limitation Mild end range limitation   Extension 10 deg flex contracture 0 deg         Strength:     Hip Strength Date:  8/24/2020       Right Left   Flexion 4 5   Extension 4 4+   IR 4 4+   ER 4 4+   Abduction 4+ (no pain) 5      Elbow strength Date:  8/24/2020       Right Left   Flexion 5 5   Extension 4 5             Special Tests:   ALFREDO: R: -     L:-  FADIR:  R: (-)    L:  (-)    Neurological Screen:   Myotomes:strong in bilateral LE's     Dermatomes: intact in bilateral LE's         Reflexes: n/a         Neural Tension Tests: SLR (-), femoral (-)  Obturator (-)  Functional Mobility:    · Double leg squat: partial   · Single leg squat:  L: n/a        R:able to do 3 sets of 20 step ups without pain  · Gait Pattern: ambulates with increased initial impact on R, decreased mid-terminal stance control on the R side, decreased anterior inonimate elevation on R side (improving)  Balance:  Single leg   L: 5 sec, mild sway  R:5 sec mild sway   CLINICAL DECISION MAKING:   Outcome Measure: Tool Used: Lower Extremity Functional Scale (LEFS)  Score:  Initial: 47/80 47/80 (Date:6-18-20 ) Most Recent: 47/80 (Date 8-24-20)   Interpretation of Score: 20 questions each scored on a 5 point scale with 0 representing \"extreme difficulty or unable to perform\" and 4 representing \"no difficulty\". The lower the score, the greater the functional disability. 80/80 represents no disability. Minimal detectable change is 9 points.     Tool Used: Disabilities of the Arm, Shoulder and Hand (DASH) Questionnaire - Quick Version  Score:  Initial: 22/55  Most Recent: 29/55 (Date:8-24-20 )   Interpretation of Score: The DASH is designed to measure the activities of daily living in person's with upper extremity dysfunction or pain. Each section is scored on a 1-5 scale, 5 representing the greatest disability. The scores of each section are added together for a total score of 55. Score 11 12-19 20-28 29-37 38-45 46-54 55   Modifier CH CI CJ CK CL CM CN     Medical Necessity:   · Patient is expected to demonstrate progress in strength, range of motion and symptom levels to return to full function. Reason for Services/Other Comments:  · Patient continues to require skilled intervention due to ongoing symptoms. · Compliance with Program/Exercises: compliant   · Recommendations/Intent for next treatment session: \"Next visit will focus on progressing the patients plan of care\".     Future Appointments   Date Time Provider Poonam Zapata   8/28/2020  3:15 PM Loretta Dumont PT, DPT [facility]   8/31/2020  3:15 PM Loretta Dumont PT DPT [facility]   9/8/2020  2:30 PM Loretta Dumont PT, DPT [facility]     Total Treatment Duration:40min, re-eval 5 min  PT Patient Time In/Time Out  Time In: 0845  Time Out: 0935      Nicol Greene PT, DPT

## 2020-08-24 NOTE — PROGRESS NOTES
Roel Andrea  : 1955  Primary: Lizabeth Patricia Ppo  Secondary:  [address]  at [facility]  [address], [address], [place] [address]  [phone]   [phone]        OUTPATIENT PHYSICAL THERAPY: Daily Treatment Note  2020    18  ICD-10: Treatment Diagnosis: Pain in right hip (M25.551); DIFFICULTY WALKING, NOT ELSEWHERE CLASSIFIED R 26.2; Pain in right elbow (M25.521)     Precautions/Allergies: non reported  Fall Risk Score: 0 (? 5 = High Risk)  MD Orders: evaluate and treat  TREATMENT PLAN:  Effective Dates:20 TO 20 (90 days). Frequency/Duration:1-2 times a week for 90 Day(s) MEDICAL/REFERRING DIAGNOSIS:right hip right elbow pain   DATE OF ONSET: surgery date: 2-15-20  REFERRING PHYSICIAN:Speedy Rosa, [place]  RETURN PHYSICIAN APPOINTMENT           Pre-treatment Symptoms/Complaints:  Reports that was doing better but then decided to really push herself and had a flare up of her hip. Her shoulder feels some better.    Pain: Initial:   Pain Intensity 1: 3 mild hip pain at rest Post Session: 0/10 pain reported at rest   Medications Last Reviewed:  2020  Updated Objective Findings:        TREATMENT:   Therapeutic Exercise: (- min) (Done in order to improve mobility, strength, function and overall understanding of condition)   Date:  20 Date  20   Activity/Exercise Parameters    Education ·  · Discussed her home program and plan of care   Clams  HEP   Side-lying abduction  HEP   Step ups  HEP   PNF extension  -   Fly's  -   Forward elevation w/ ER resistance  -   Thoracic foam roll Thoracic extension 10x in chair Reviewed    Posterior capsule shoulder stretch · Cross adduction 30 sec x3 sets reviewed   Shoulder AROM IR, extension 10x each working end range of motion -   Biceps stretch  -     Manual Therapy: (40 min) (Done to improve mobility, reduce tone, guarding and pain)  · Soft tissue mobilization to glute med and min tendon   · R inonimate posterior rotation, MET   · Posterior/inferior hip capsule mobilization done with leg straight, adduction and IR, Grade IV (not done today)  · Posterior shoulder capsule soft tissue mobilization, grade IV, done with cross adduction position   · R TFL, glut med tendon, IT band fascia and scar soft tissue mobilization/release   · Posterior shoulder capsule STM (not done today)      Modalities: ()    [facility] Portal  Treatment/Session Summary:    · Response to Treatment: Ms. Carina Arcos tolerated the session well. She had moderate tightness in her glut med tendon. Worked on lengthening the tissue. Again educated about gradual loading program and tendon physiology. Reviewed her home program.  · Communication/Consultation:  None today  · Equipment provided today:  None today  · Recommendations/Intent for next treatment session: Next visit will focus on progressing established plan of care.   Total Treatment Billable Duration:  Manual-40 min, re-eval 5 min    PT Patient Time In/Time Out  Time In: 0892  Time Out: 3414    Keith Bang, PT, DPT

## 2020-08-27 ENCOUNTER — HOSPITAL ENCOUNTER (OUTPATIENT)
Dept: PHYSICAL THERAPY | Age: 65
Discharge: HOME | End: 2020-08-27
Payer: COMMERCIAL

## 2020-08-27 PROCEDURE — 97110 THERAPEUTIC EXERCISES: CPT

## 2020-08-27 PROCEDURE — 97140 MANUAL THERAPY 1/> REGIONS: CPT

## 2020-08-27 NOTE — PROGRESS NOTES
Sobia Dumont  : 1955  Primary: Sara Greeranatoly Ppo  Secondary:  [address]  at [facility]  [address], [address], [place]  [phone]   [phone]        OUTPATIENT PHYSICAL THERAPY: Daily Treatment Note  2020    19  ICD-10: Treatment Diagnosis: Pain in right hip (M25.551); DIFFICULTY WALKING, NOT ELSEWHERE CLASSIFIED R 26.2; Pain in right elbow (M25.521)     Precautions/Allergies: non reported  Fall Risk Score: 0 (? 5 = High Risk)  MD Orders: evaluate and treat  TREATMENT PLAN:  Effective Dates:20 TO 20 (90 days). Frequency/Duration:1-2 times a week for 90 Day(s) MEDICAL/REFERRING DIAGNOSIS:right hip right elbow pain   DATE OF ONSET: surgery date: 2-15-20  REFERRING PHYSICIAN:Osmel Rosa [place]  RETURN PHYSICIAN APPOINTMENT           Pre-treatment Symptoms/Complaints:  Reports that her hip is still sore so she would like to work on her right shoulder.   Pain: Initial:     mild hip pain at rest Post Session: reports feeling pretty good   Medications Last Reviewed:  2020  Updated Objective Findings:        TREATMENT:   Therapeutic Exercise: (20 min) (Done in order to improve mobility, strength, function and overall understanding of condition)   Date:  20 Date  20 Date  20   Activity/Exercise Parameters     Education ·  · Discussed her home program and plan of care · Discussed HEP   UBE   10 min, post session   Clams  HEP HEP   Side-lying abduction  HEP HEP   Step ups  HEP HEP   PNF extension  - Worked on end range, 10 sec hold, 10x   Fly's  - HEP   Forward elevation w/ ER resistance  - -   Thoracic foam roll Thoracic extension 10x in chair Reviewed  HEP   Posterior capsule shoulder stretch · Cross adduction 30 sec x3 sets reviewed Sleeper stretch: 60 sec  Cross arm; 60 sec, 3x   Shoulder AROM IR, extension 10x each working end range of motion - HEP   Biceps stretch  -    IR   Manual resistance at end range, 5 sec, 10x Manual Therapy: (30 min) (Done to improve mobility, reduce tone, guarding and pain)  · Soft tissue mobilization to glute med and min tendon (not done today)  · R inonimate posterior rotation, MET   · Posterior/inferior hip capsule mobilization done with leg straight, adduction and IR, Grade IV (not done today)  · Posterior shoulder capsule soft tissue mobilization, grade IV, done with cross adduction position   · R TFL, glut med tendon, IT band fascia and scar soft tissue mobilization/release   · Posterior shoulder capsule STM   · Scapular posterior depression mobilization      Modalities: ()    [facility] Portal  Treatment/Session Summary:    · Response to Treatment: Ms. Alexei Geller tolerated the session well. Since she is still sore in her glut med tendon, we decided to let that rest but again discussed her plan of care for that issue. Also educated her about her diet. Her shoulder ROM is progressing gradually. · Communication/Consultation:  None today  · Equipment provided today:  None today  · Recommendations/Intent for next treatment session: Next visit will focus on progressing established plan of care.   Total Treatment Billable Duration:  Manual-30 min, TE-20 min    PT Patient Time In/Time Out  Time In: 0930  Time Out: [address] [facility], PT, DPT

## 2020-08-31 ENCOUNTER — HOSPITAL ENCOUNTER (OUTPATIENT)
Dept: PHYSICAL THERAPY | Age: 65
Discharge: HOME | End: 2020-08-31
Payer: COMMERCIAL

## 2020-08-31 PROCEDURE — 97140 MANUAL THERAPY 1/> REGIONS: CPT

## 2020-08-31 PROCEDURE — 97110 THERAPEUTIC EXERCISES: CPT

## 2020-08-31 PROCEDURE — 97035 APP MDLTY 1+ULTRASOUND EA 15: CPT

## 2020-08-31 NOTE — PROGRESS NOTES
Cl Ta  : 1955  Primary: Jillian Parra Aejoeanatoly Ppo  Secondary:  [address] at [facility]  [address], [address], [place] [address]  [phone]   [phone]        OUTPATIENT PHYSICAL THERAPY: Daily Treatment Note  2020  ICD-10: Treatment Diagnosis: Pain in right hip (M25.551); DIFFICULTY WALKING, NOT ELSEWHERE CLASSIFIED R 26.2; Pain in right elbow (M25.521)     Precautions/Allergies: non reported  Fall Risk Score: 0 (? 5 = High Risk)  MD Orders: evaluate and treat  TREATMENT PLAN:  Effective Dates:20 TO 20 (90 days). Frequency/Duration:1-2 times a week for 90 Day(s) MEDICAL/REFERRING DIAGNOSIS:right hip right elbow pain   DATE OF ONSET: surgery date: 2-15-20  REFERRING PHYSICIAN:Sarabjit Rosa [place]  RETURN PHYSICIAN APPOINTMENT           Pre-treatment Symptoms/Complaints:  Reports that her hip is still sore and that has really effected her mood. Shoulder is sore but not always.    Pain: Initial:     mild hip pain at rest Post Session: reports feeling pretty good   Medications Last Reviewed:  2020  Updated Objective Findings:        TREATMENT:   Therapeutic Exercise: (20 min) (Done in order to improve mobility, strength, function and overall understanding of condition)   Date  20 Date  20 Date  20   Activity/Exercise      Education · Discussed her home program and plan of care · Discussed HEP · Discussed HEP   UBE  10 min, post session 10 min, post session   Clams HEP HEP Isometric, 5 sec hold, 10x, submax   Side-lying abduction HEP HEP HEP   Step ups HEP HEP HEP   PNF extension - Worked on end range, 10 sec hold, 10x -   Fly's - HEP -   Forward elevation w/ ER resistance - - -   Thoracic foam roll Reviewed  HEP -   Posterior capsule shoulder stretch reviewed Sleeper stretch: 60 sec  Cross arm; 60 sec, 3x Sleeper stretch: 60 sec  Cross arm; 60 sec, 3x   Shoulder AROM - HEP    Biceps stretch -     IR  Manual resistance at end range, 5 sec, 10x Manual resistance at end range, 5 sec, 10x     Manual Therapy: (20 min) (Done to improve mobility, reduce tone, guarding and pain)  · R inonimate posterior rotation, MET   · Posterior shoulder capsule soft tissue mobilization, grade IV, done with cross adduction position   · R TFL, glut med tendon, IT band fascia and scar soft tissue mobilization/release (not done today)  · Posterior shoulder capsule soft tissue mobilization done with progressive mobilization, cross arm and sleeper stretch position  · Scapular posterior depression mobilization      Modalities: ( done in order to reduce symptoms and improve mobility)  Ultrasound to R hip glut med tendon, 1.2, continuous, 10 min    [employer] Portal  Treatment/Session Summary:    · Response to Treatment: Ms. Federico Cheung tolerated the session well. We again discussed her home program and educated her about proper loading of the tendon. · Communication/Consultation:  None today  · Equipment provided today:  None today  · Recommendations/Intent for next treatment session: Next visit will focus on progressing established plan of care.   Total Treatment Billable Duration:  Manual-20 min, TE-20 min, ultrasound-10 min         Rosita Gonzalez, PT, DPT

## 2020-09-03 ENCOUNTER — HOSPITAL ENCOUNTER (OUTPATIENT)
Dept: PHYSICAL THERAPY | Age: 65
Discharge: HOME | End: 2020-09-03
Payer: COMMERCIAL

## 2020-09-03 PROCEDURE — 97110 THERAPEUTIC EXERCISES: CPT

## 2020-09-03 PROCEDURE — 97140 MANUAL THERAPY 1/> REGIONS: CPT

## 2020-09-03 NOTE — PROGRESS NOTES
Felix Lea  : 1955  Primary: Heena Ibrahim Aejoeanatoly Ppo  Secondary:  [address] at [facility]  [address], [address], [place] [address]  [phone]   [phone]        OUTPATIENT PHYSICAL THERAPY: Daily Treatment Note  9/3/2020    21  ICD-10: Treatment Diagnosis: Pain in right hip (M25.551); DIFFICULTY WALKING, NOT ELSEWHERE CLASSIFIED R 26.2; Pain in right elbow (M25.521)     Precautions/Allergies: non reported  Fall Risk Score: 0 (? 5 = High Risk)  MD Orders: evaluate and treat  TREATMENT PLAN:  Effective Dates:20 TO 20 (90 days). Frequency/Duration:1-2 times a week for 90 Day(s) MEDICAL/REFERRING DIAGNOSIS:right hip right elbow pain   DATE OF ONSET: surgery date: 2-15-20  REFERRING PHYSICIAN:Mariely Rosa, [address]  RETURN PHYSICIAN APPOINTMENT           Pre-treatment Symptoms/Complaints:  Reports that her hip is still about the same from last session.   Pain: Initial:     mild hip pain at rest Post Session: mild soreness reported   Medications Last Reviewed:  9/3/2020  Updated Objective Findings:        TREATMENT:   Therapeutic Exercise: (20 min) (Done in order to improve mobility, strength, function and overall understanding of condition)   Date  20 Date  20 Date  9-3-20   Activity/Exercise      Education · Discussed HEP · Discussed HEP · Discussed HEP   UBE 10 min, post session 10 min, post session -   Clams HEP Isometric, 5 sec hold, 10x, submax · Isometric, 5 sec, 3x  · [id number]   Side-lying abduction HEP HEP In supine, 10x, manual resistance   inonimate elevation, depression   10 sec end range holds   Step ups HEP HEP HEP   PNF extension Worked on end range, 10 sec hold, 10x - -   Fly's HEP - -   Forward elevation w/ ER resistance - - -   Thoracic foam roll HEP - HEP   Posterior capsule shoulder stretch Sleeper stretch: 60 sec  Cross arm; 60 sec, 3x Sleeper stretch: 60 sec  Cross arm; 60 sec, 3x HEP   Shoulder AROM HEP     Biceps stretch IR Manual resistance at end range, 5 sec, 10x Manual resistance at end range, 5 sec, 10x Manual resistance at end range, 5 sec, 10x     Manual Therapy: (30 min) (Done to improve mobility, reduce tone, guarding and pain)  · R inonimate posterior rotation, stabilized sacrumMET   · PA mobilization to R sacral base, grade IV, in prone and supine  · R inonimate anterior elevation, posterior depression  · Posterior shoulder capsule soft tissue mobilization, grade IV, done with cross adduction position   · Posterior shoulder capsule soft tissue mobilization done with progressive mobilization, cross arm and sleeper stretch position (not done today)  · Scapular posterior depression mobilization (not done today)      Modalities: ( done in order to reduce symptoms and improve mobility)      [employer] Portal  Treatment/Session Summary:    · Response to Treatment: Ms. Marcin Ellsworth tolerated the session well. She had a very hypomobile R sacral base today. She was able to tolerate clams and supine abduction today which is a good sign. Reviewed her home program.   · Communication/Consultation:  None today  · Equipment provided today:  None today  · Recommendations/Intent for next treatment session: Next visit will focus on progressing established plan of care.   Total Treatment Billable Duration:  Manual-30 min, TE-20 min,     PT Patient Time In/Time Out  Time In: 1105  Time Out: [address], PT, DPT

## 2020-09-08 ENCOUNTER — HOSPITAL ENCOUNTER (OUTPATIENT)
Dept: PHYSICAL THERAPY | Age: 65
Discharge: HOME | End: 2020-09-08
Payer: COMMERCIAL

## 2020-09-08 PROCEDURE — 97110 THERAPEUTIC EXERCISES: CPT

## 2020-09-08 PROCEDURE — 97140 MANUAL THERAPY 1/> REGIONS: CPT

## 2020-09-08 NOTE — PROGRESS NOTES
Steph Cornell  : 1955  Primary: [employer] Ppo  Secondary:  [address]  at [facility]  [address], [address], [place] [address]  [phone]   [phone]        OUTPATIENT PHYSICAL THERAPY: Daily Treatment Note  2020    22  ICD-10: Treatment Diagnosis: Pain in right hip (M25.551); DIFFICULTY WALKING, NOT ELSEWHERE CLASSIFIED R 26.2; Pain in right elbow (M25.521)     Precautions/Allergies: non reported  Fall Risk Score: 0 (? 5 = High Risk)  MD Orders: evaluate and treat  TREATMENT PLAN:  Effective Dates:20 TO 20 (90 days). Frequency/Duration:1-2 times a week for 90 Day(s) MEDICAL/REFERRING DIAGNOSIS:right hip right elbow pain   DATE OF ONSET: surgery date: 2-15-20  REFERRING PHYSICIAN:Herson [place]  RETURN PHYSICIAN APPOINTMENT           Pre-treatment Symptoms/Complaints:  Reports that her hip has felt pretty good. States she has been taking anti-inflammatories and is not sure whether that helped.   Pain: Initial:     minimal symptoms at rest Post Session: minimal symptoms at rest   Medications Last Reviewed:  2020  Updated Objective Findings:        TREATMENT:   Therapeutic Exercise: (20 min) (Done in order to improve mobility, strength, function and overall understanding of condition)   Date  20 Date  9-3-20 Date  20   Activity/Exercise      Education · Discussed HEP · Discussed HEP · Discussed HEP   UBE 10 min, post session -    Clams Isometric, 5 sec hold, 10x, submax · Isometric, 5 sec, 3x  · [id number] · Done with pressure at R sacral base, 15x, 2 sets   Side-lying abduction HEP In supine, 10x, manual resistance · Done with pressure at R sacral base, 15x, 2 sets   inonimate elevation, depression  10 sec end range holds    Step ups HEP HEP · Done with pressure at R sacral base, 10x, 2 sets   Posterior capsule shoulder stretch Sleeper stretch: 60 sec  Cross arm; 60 sec, 3x HEP HEP   Shoulder AROM   --   Biceps stretch   -   IR Manual resistance at end range, 5 sec, 10x Manual resistance at end range, 5 sec, 10x -     Manual Therapy: (30 min) (Done to improve mobility, reduce tone, guarding and pain)  · R inonimate posterior rotation, stabilized sacrum MET   · PA mobilization to R sacral base, grade IV, in prone and supine  · R inonimate anterior elevation, posterior depression  · Posterior shoulder capsule soft tissue mobilization, grade IV, done with cross adduction position (not done today)  · Posterior shoulder capsule soft tissue mobilization done with progressive mobilization, cross arm and sleeper stretch position (not done today)  · Scapular posterior depression mobilization (not done today)      Modalities: ( done in order to reduce symptoms and improve mobility)      [facility] Portal  Treatment/Session Summary:    · Response to Treatment: Ms. Alexei Geller tolerated the session well. Her R sacral base had improved mobility today compared to last week but we still worked on it. She was able to tolerate all the exercises without increased pain which is a great improvement. She was educated to self mobilize and we discussed using an SI belt potentially. · Communication/Consultation:  None today  · Equipment provided today:  None today  · Recommendations/Intent for next treatment session: Next visit will focus on progressing established plan of care.   Total Treatment Billable Duration:  Manual-30 min, TE-20 min,     PT Patient Time In/Time Out  Time In: 9433  Time Out: 31483 Gilles Rodriguez, PT, DPT

## 2020-09-11 ENCOUNTER — HOSPITAL ENCOUNTER (OUTPATIENT)
Dept: PHYSICAL THERAPY | Age: 65
Discharge: HOME | End: 2020-09-11
Payer: COMMERCIAL

## 2020-09-11 PROCEDURE — 97140 MANUAL THERAPY 1/> REGIONS: CPT

## 2020-09-11 PROCEDURE — 97110 THERAPEUTIC EXERCISES: CPT

## 2020-09-11 NOTE — PROGRESS NOTES
Ronni Bennett  : 1955  Primary: Carlos Davey Ppo  Secondary:  [address]  at [facility]  [address], [address], [place] [address]  [phone]   [phone]        OUTPATIENT PHYSICAL THERAPY: Daily Treatment Note  2020    23  ICD-10: Treatment Diagnosis: Pain in right hip (M25.551); DIFFICULTY WALKING, NOT ELSEWHERE CLASSIFIED R 26.2; Pain in right elbow (M25.521)     Precautions/Allergies: non reported  Fall Risk Score: 0 (? 5 = High Risk)  MD Orders: evaluate and treat  TREATMENT PLAN:  Effective Dates:20 TO 20 (90 days). Frequency/Duration:1-2 times a week for 90 Day(s) MEDICAL/REFERRING DIAGNOSIS:right hip right elbow pain   DATE OF ONSET: surgery date: 2-15-20  REFERRING PHYSICIAN:Rahat Rosa [place]  RETURN PHYSICIAN APPOINTMENT           Pre-treatment Symptoms/Complaints:  Reports that her hip was a little sore over the last couple days. She was still able to do all the exercises with minimal pain at home but had soreness at night.   Pain: Initial:     minimal symptoms at rest Post Session: minimal symptoms at rest   Medications Last Reviewed:  2020  Updated Objective Findings:        TREATMENT:   Therapeutic Exercise: (15 min) (Done in order to improve mobility, strength, function and overall understanding of condition)   Date  20 Date  20   Activity/Exercise     Education · Discussed HEP · Discussed HEP  · Educated to only do the strength training for the hip 3x/wk and then do global 2 leg exercises the other 3 days with 1 day of full rest   UBE     Clams · Done with pressure at R sacral base, 15x, 2 sets ·    Side-lying abduction · Done with pressure at R sacral base, 15x, 2 sets ·    inonimate elevation, depression     Step ups · Done with pressure at R sacral base, 10x, 2 sets ·    Posterior capsule shoulder stretch HEP    Shoulder AROM --    Biceps stretch -    IR - End range: 10 sec hold,10x   Fly's  Manual resistance, 10x, 2 sets   Push up  Partial, 10x     Manual Therapy: (30 min) (Done to improve mobility, reduce tone, guarding and pain)  · R inonimate posterior rotation, stabilized sacrum MET   · PA mobilization to R sacral base, grade IV, in prone and supine  · R inonimate anterior elevation, posterior depression (not done today)  · Posterior shoulder capsule soft tissue mobilization, grade IV, done with cross adduction position   · Posterior shoulder capsule soft tissue mobilization done with progressive mobilization, cross arm and sleeper stretch position  · Scapular posterior depression mobilization       Modalities: ( done in order to reduce symptoms and improve mobility)  Heat x 10 min to right shoulder    [employer] Portal  Treatment/Session Summary:    · Response to Treatment: Ms. Jodi Hodgkin tolerated the session relatively well. We discussed her treatment program for her hip and revised the exercise routine to include more rest breaks throughout the week. · Communication/Consultation:  None today  · Equipment provided today:  None today  · Recommendations/Intent for next treatment session: Next visit will focus on progressing established plan of care.   Total Treatment Billable Duration:  Manual-30 min, TE-15 min,     PT Patient Time In/Time Out  Time In: 1000  Time Out: [address], PT, DPT

## 2020-09-18 ENCOUNTER — HOSPITAL ENCOUNTER (OUTPATIENT)
Dept: PHYSICAL THERAPY | Age: 65
Discharge: HOME | End: 2020-09-18
Payer: COMMERCIAL

## 2020-09-18 PROCEDURE — 97110 THERAPEUTIC EXERCISES: CPT

## 2020-09-18 PROCEDURE — 97140 MANUAL THERAPY 1/> REGIONS: CPT

## 2020-09-18 NOTE — PROGRESS NOTES
Christa Caul  : 1955  Primary: Bettie Pope Ppo  Secondary:  [address]  at [facility]  [address], [address], [place] [address]  [phone]   [phone]        OUTPATIENT PHYSICAL THERAPY: Daily Treatment Note  2020    24  ICD-10: Treatment Diagnosis: Pain in right hip (M25.551); DIFFICULTY WALKING, NOT ELSEWHERE CLASSIFIED R 26.2; Pain in right elbow (M25.521)     Precautions/Allergies: non reported  Fall Risk Score: 0 (? 5 = High Risk)  MD Orders: evaluate and treat  TREATMENT PLAN:  Effective Dates:20 TO 20 (90 days). Frequency/Duration:1-2 times a week for 90 Day(s) MEDICAL/REFERRING DIAGNOSIS:right hip right elbow pain   DATE OF ONSET: surgery date: 2-15-20  REFERRING PHYSICIAN:Shaw Rosa [place]  RETURN PHYSICIAN APPOINTMENT           Pre-treatment Symptoms/Complaints:  Reports that she went camping and did a good amount of bike riding and felt relatively good. She came home and did her exercises without issues but this morning started feeling increased soreness again.   Pain: Initial:     minimal symptoms at rest Post Session: minimal symptoms at rest   Medications Last Reviewed:  2020  Updated Objective Findings:        TREATMENT:   Therapeutic Exercise: (15 min) (Done in order to improve mobility, strength, function and overall understanding of condition)   Date  20 Date  20 Date  20   Activity/Exercise      Education · Discussed HEP · Discussed HEP  · Educated to only do the strength training for the hip 3x/wk and then do global 2 leg exercises the other 3 days with 1 day of full rest · Reviewed home program   UBE   -   Clams · Done with pressure at R sacral base, 15x, 2 sets ·  · -   Side-lying abduction · Done with pressure at R sacral base, 15x, 2 sets ·  · -   inonimate elevation, depression   -   Step ups · Done with pressure at R sacral base, 10x, 2 sets ·  · -   Posterior capsule shoulder stretch HEP  -   Shoulder AROM --  -   Biceps stretch -  -   IR - End range: 10 sec hold,10x End range, 10 sec hold, 5x followed by isotonic control   Fly's  Manual resistance, 10x, 2 sets 10x, 2 sets, manual resistance   Push up  Partial, 10x HEP     Manual Therapy: (30 min) (Done to improve mobility, reduce tone, guarding and pain)  · R inonimate posterior rotation, stabilized sacrum MET   · PA mobilization to R sacral base, grade IV, in prone and supine  · R inonimate anterior elevation, posterior depression (not done today)  · Posterior shoulder capsule soft tissue mobilization, grade IV, done with cross adduction position   · Posterior shoulder capsule soft tissue mobilization done with progressive mobilization, cross arm and sleeper stretch position  · Scapular posterior depression mobilization       Modalities: ( done in order to reduce symptoms and improve mobility)  Heat x 10 min to right shoulder    [facility] Portal  Treatment/Session Summary:    · Response to Treatment: Ms. Michelle Zhou tolerated the session well. We did not work on her hip because she already had soreness. Her shoulder strength is progressing well. · Communication/Consultation:  None today  · Equipment provided today:  None today  · Recommendations/Intent for next treatment session: Next visit will focus on progressing established plan of care.   Total Treatment Billable Duration:  Manual-30 min, TE-15 min,     PT Patient Time In/Time Out  Time In: 1610  Time Out: [address], JUSTYNA, DPT

## 2020-09-25 ENCOUNTER — HOSPITAL ENCOUNTER (OUTPATIENT)
Dept: PHYSICAL THERAPY | Age: 65
Discharge: HOME | End: 2020-09-25
Payer: COMMERCIAL

## 2020-09-25 PROCEDURE — 97110 THERAPEUTIC EXERCISES: CPT

## 2020-09-25 PROCEDURE — 97140 MANUAL THERAPY 1/> REGIONS: CPT

## 2020-09-25 NOTE — PROGRESS NOTES
Michelle Gan  : 1955  Primary: [address] [employer] Ppo  Secondary:  [address] at [facility]  [address], [address], [place] [address]  [phone]   [phone]        OUTPATIENT PHYSICAL THERAPY: Daily Treatment Note  2020    25  ICD-10: Treatment Diagnosis: Pain in right hip (M25.551); DIFFICULTY WALKING, NOT ELSEWHERE CLASSIFIED R 26.2; Pain in right elbow (M25.521)     Precautions/Allergies: non reported  Fall Risk Score: 0 (? 5 = High Risk)  MD Orders: evaluate and treat  TREATMENT PLAN:  Effective Dates:20 TO 20 (90 days). Frequency/Duration:1-2 times a week for 90 Day(s) MEDICAL/REFERRING DIAGNOSIS:right hip right elbow pain   DATE OF ONSET: surgery date: 2-15-20  REFERRING PHYSICIAN:Marija Rosa [place]  RETURN PHYSICIAN APPOINTMENT           Pre-treatment Symptoms/Complaints:  Reports that she saw her chiropractor who did an \"adjustment\" with heat and TENS. She did not feel much better from that. Overall she feels sore still by the end of the day.   Pain: Initial:     minimal symptoms at rest Post Session: minimal symptoms at rest   Medications Last Reviewed:  2020  Updated Objective Findings:        TREATMENT:   Therapeutic Exercise: (15 min) (Done in order to improve mobility, strength, function and overall understanding of condition)   Date  20 Date  20 Date  20   Activity/Exercise      Education · Discussed HEP  · Educated to only do the strength training for the hip 3x/wk and then do global 2 leg exercises the other 3 days with 1 day of full rest · Reviewed home program · Discussed HEP   Clams ·  · - HEP    Side-lying abduction ·  · - HEP   Step ups ·  · -    Posterior capsule shoulder stretch  - HEP   IR End range: 10 sec hold,10x End range, 10 sec hold, 5x followed by isotonic control HEP   Fly's Manual resistance, 10x, 2 sets 10x, 2 sets, manual resistance HEP   Push up Partial, 10x HEP HEP   Hip flexor stretch   Half kneeling- reviewed   Self inonimate and sacral mobilization   Reviewed      Manual Therapy: (35 min) (Done to improve mobility, reduce tone, guarding and pain)  · R inonimate posterior rotation, stabilized sacrum MET   · PA mobilization to R sacral base, grade IV, in prone and supine  · R inonimate medial rotation mobilization, MET technique in prone  · Hip flexor manual release on R side, done with active elongation   · Posterior shoulder capsule soft tissue mobilization, grade IV, done with cross adduction position (not done today)  · Posterior shoulder capsule soft tissue mobilization done with progressive mobilization, cross arm and sleeper stretch position  · Scapular posterior depression mobilization       Modalities: ( done in order to reduce symptoms and improve mobility)  -    [facility] Portal  Treatment/Session Summary:    · Response to Treatment: Ms. Alma Montenegro tolerated the session well. Her leg length becomes much more significant once the soft tissue of the hip flexor becomes relaxed and the inonimate has proper mobility. Reviewed her leg length and appropriate height for lifts. Recommended to see Ailyn Deng for a second opinion. · Communication/Consultation:  None today  · Equipment provided today:  None today  · Recommendations/Intent for next treatment session: Next visit will focus on progressing established plan of care.   Total Treatment Billable Duration:  Manual-30 min, TE-15 min,     PT Patient Time In/Time Out  Time In: 1100  Time Out: 6648-B Anthony López, PT, DPT

## 2020-10-22 ENCOUNTER — HOSPITAL ENCOUNTER (OUTPATIENT)
Dept: PHYSICAL THERAPY | Age: 65
Discharge: HOME | End: 2020-10-22
Payer: COMMERCIAL

## 2020-10-22 PROCEDURE — 97140 MANUAL THERAPY 1/> REGIONS: CPT

## 2020-10-22 PROCEDURE — 97110 THERAPEUTIC EXERCISES: CPT

## 2020-10-22 NOTE — PROGRESS NOTES
Neftaly Perez  : 1955  Primary: [employer]  Secondary:  [address]  at [facility]  [address], [address], [place]  [phone]   [phone]        OUTPATIENT PHYSICAL THERAPY: Daily Treatment Note  10/22/2020    26  ICD-10: Treatment Diagnosis: Pain in right hip (M25.551); DIFFICULTY WALKING, NOT ELSEWHERE CLASSIFIED R 26.2; Pain in right elbow (M25.521)     Precautions/Allergies: non reported  Fall Risk Score: 0 (? 5 = High Risk)  MD Orders: evaluate and treat  TREATMENT PLAN:  Effective Dates:20 TO 20 (90 days). Frequency/Duration:1-2 times a week for 90 Day(s) MEDICAL/REFERRING DIAGNOSIS:right hip right elbow pain   DATE OF ONSET: surgery date: 2-15-20  REFERRING PHYSICIAN:Johnathan Rosa [place]  RETURN PHYSICIAN APPOINTMENT           Pre-treatment Symptoms/Complaints:  Reports that was in [place] the last several weeks. Overall doing better in both hip and shoulder. Still has pain proximally in the hip at times but not nearly as bad.    Pain: Initial:     minimal symptoms at rest Post Session: minimal symptoms at rest   Medications Last Reviewed:  10/22/2020  Updated Objective Findings:        TREATMENT:   Therapeutic Exercise: (15 min) (Done in order to improve mobility, strength, function and overall understanding of condition)   Date  20 Date  20 Date  10-22-20   Activity/Exercise      Education · Reviewed home program · Discussed HEP · Reviewed her loading program  · Reviewed her mobility program  · Reviewed her progressions  · Discussed her heel lift   Clams · - HEP  HEP   Bridge ·   10x, blue band resistance, abd resistance, glut dominant    Side-lying abduction · - HEP HEP   Hip openers  ·   Discussed working on hip out motions at different angles (pain free)   Step ups · -  HEP   Posterior capsule shoulder stretch - HEP Reviewed    Shoulder IR End range, 10 sec hold, 5x followed by isotonic control HEP HEP   Fly's 10x, 2 sets, manual resistance HEP Manual resistance, working on eccentric control, 10x   Push up HEP HEP    Hip flexor stretch  Half kneeling- reviewed reviewed   Self inonimate and sacral mobilization  Reviewed  Reviewed and discussed      Manual Therapy: (35 min) (Done to improve mobility, reduce tone, guarding and pain)  · R inonimate posterior rotation, stabilized sacrum,MET   · PA mobilization to R sacral base, grade IV, in prone and supine  · R inonimate medial rotation mobilization, MET technique in prone  · Hip flexor manual release on R side, done with active elongation (not done today)  · Posterior shoulder capsule soft tissue mobilization, grade IV, done with cross adduction position (not done today)  · Posterior shoulder capsule soft tissue mobilization done with progressive mobilization, cross arm and sleeper stretch position  · Scapular posterior depression mobilization       Modalities: ( done in order to reduce symptoms and improve mobility)  -    [facility]  Treatment/Session Summary:    · Response to Treatment: Ms. Jaye Wright tolerated the session well. She did very well today and had pain free hip abduction, minimal hip impingement and good mobility in her R sacral base. Her shoulder IR was 55 deg today which is a good improvement from last re-certification. We discussed her loading and mobility program at length. Also encouraged to purchase a permanent heel lift. She will see Babs Reynolds next visit for another opinion on direction of her plan of care. · Communication/Consultation:  None today  · Equipment provided today:  None today  · Recommendations/Intent for next treatment session: Next visit will focus on progressing established plan of care.   Total Treatment Billable Duration:  Manual-30 min, TE-15 min,     PT Patient Time In/Time Out  Time In: 0845  Time Out: 0930    Russell Dale, PT, DPT

## 2020-10-29 ENCOUNTER — HOSPITAL ENCOUNTER (OUTPATIENT)
Dept: PHYSICAL THERAPY | Age: 65
Discharge: HOME | End: 2020-10-29
Payer: COMMERCIAL

## 2020-10-29 NOTE — PROGRESS NOTES
Nori Traylor   ([id number]) Therapy Center at  [address]  [phone]  [phone]             DATE: 10/29/2020    Patient cancelled for appointment today due to inclement weather. Notes she is feeling better and does not want to reschedule at this time. She will follow up with Katrina Webb if she feels she needs to schedule additional visits.       Marina Rico PT, DPT, CFMT

## 2021-01-12 NOTE — THERAPY DISCHARGE
Lilia Aguirre  : 1955  Primary: Beto [employer] Ppo  Secondary:  [facility], [address], [place] [address]  [phone]   [phone]         OUTPATIENT PHYSICAL THERAPY:Discontinuation Summary 10/22/2020    ICD-10: Treatment Diagnosis: Pain in right hip (M25.551); DIFFICULTY WALKING, NOT ELSEWHERE CLASSIFIED R 26.2; Pain in right elbow (M25.521)    Precautions/Allergies: non reported  Fall Risk Score: 0 (? 5 = High Risk)  MD Orders: evaluate and treat   MEDICAL/REFERRING DIAGNOSIS:right hip right elbow pain   DATE OF ONSET: surgery date: 2-15-20  REFERRING PHYSICIAN:Stephany Rosa [place] RETURN PHYSICIAN APPOINTMENT: did not specify       ASSESSMENT:  Lilia Aguirre has been seen in physical therapy from 20 to 10-22-20 for 26 visits. Treatment has been discontinued at this time due to patient relocating to [place] for the winter season. .  During therapy she did make good initial improvements but still struggled with symptoms at times depending on what activity she performs. Her pain was hard to pinpoint but seemed to have several root causes including SI and glut med tendinopathy. We followed up recently via e-mail and she is seeing several practitioners currently in [place].  Thank you for this referral.       Kathy Rondon PT,DPT,OCS,CFMT

## 2021-01-22 ENCOUNTER — IMPORTED ENCOUNTER (OUTPATIENT)
Dept: URBAN - METROPOLITAN AREA CLINIC 31 | Facility: CLINIC | Age: 66
End: 2021-01-22

## 2021-01-22 PROBLEM — H01.005: Noted: 2021-01-22

## 2021-01-22 PROBLEM — H01.001: Noted: 2021-01-22

## 2021-01-22 PROBLEM — H01.002: Noted: 2021-01-22

## 2021-01-22 PROBLEM — H01.004: Noted: 2021-01-22

## 2021-01-22 PROBLEM — H40.013: Noted: 2021-01-22

## 2021-01-22 PROBLEM — H35.341: Noted: 2021-01-22

## 2021-01-22 PROBLEM — H25.013: Noted: 2021-01-22

## 2021-01-22 PROCEDURE — 92004 COMPRE OPH EXAM NEW PT 1/>: CPT

## 2021-01-22 PROCEDURE — 92015 DETERMINE REFRACTIVE STATE: CPT

## 2021-01-22 PROCEDURE — 92250 FUNDUS PHOTOGRAPHY W/I&R: CPT

## 2021-01-22 PROCEDURE — 92134 CPTRZ OPH DX IMG PST SGM RTA: CPT

## 2021-02-01 ENCOUNTER — NEW REFERRAL (OUTPATIENT)
Dept: URBAN - METROPOLITAN AREA CLINIC 33 | Facility: CLINIC | Age: 66
End: 2021-02-01

## 2021-02-01 VITALS
DIASTOLIC BLOOD PRESSURE: 57 MMHG | HEART RATE: 68 BPM | HEIGHT: 67 IN | SYSTOLIC BLOOD PRESSURE: 87 MMHG | BODY MASS INDEX: 18.52 KG/M2 | WEIGHT: 118 LBS

## 2021-02-01 DIAGNOSIS — H43.823: ICD-10-CM

## 2021-02-01 DIAGNOSIS — H43.393: ICD-10-CM

## 2021-02-01 DIAGNOSIS — H35.341: ICD-10-CM

## 2021-02-01 DIAGNOSIS — H40.013: ICD-10-CM

## 2021-02-01 PROCEDURE — 92250 FUNDUS PHOTOGRAPHY W/I&R: CPT

## 2021-02-01 PROCEDURE — 92235 FLUORESCEIN ANGRPH MLTIFRAME: CPT

## 2021-02-01 PROCEDURE — 99204 OFFICE O/P NEW MOD 45 MIN: CPT

## 2021-02-01 ASSESSMENT — TONOMETRY
OD_IOP_MMHG: 13
OS_IOP_MMHG: 11

## 2021-02-01 ASSESSMENT — VISUAL ACUITY
OS_SC: 20/20
OD_SC: 20/50-2
OD_PH: 20/50-1

## 2021-02-04 ENCOUNTER — IMPORTED ENCOUNTER (OUTPATIENT)
Dept: URBAN - METROPOLITAN AREA CLINIC 31 | Facility: CLINIC | Age: 66
End: 2021-02-04

## 2021-02-04 PROBLEM — H25.812: Noted: 2021-02-04

## 2021-02-04 PROBLEM — H35.341: Noted: 2021-02-04

## 2021-02-04 PROBLEM — H25.811: Noted: 2021-02-04

## 2021-02-04 PROCEDURE — 99213 OFFICE O/P EST LOW 20 MIN: CPT

## 2021-02-08 ENCOUNTER — IMPORTED ENCOUNTER (OUTPATIENT)
Dept: URBAN - METROPOLITAN AREA CLINIC 31 | Facility: CLINIC | Age: 66
End: 2021-02-08

## 2021-02-08 PROBLEM — H25.811: Noted: 2021-02-08

## 2021-02-08 PROCEDURE — 92136 OPHTHALMIC BIOMETRY: CPT

## 2021-02-17 ENCOUNTER — IMPORTED ENCOUNTER (OUTPATIENT)
Dept: URBAN - METROPOLITAN AREA CLINIC 31 | Facility: CLINIC | Age: 66
End: 2021-02-17

## 2021-02-17 PROBLEM — Z96.1: Noted: 2021-02-17

## 2021-02-17 PROCEDURE — 99024 POSTOP FOLLOW-UP VISIT: CPT

## 2021-02-24 ENCOUNTER — IMPORTED ENCOUNTER (OUTPATIENT)
Dept: URBAN - METROPOLITAN AREA CLINIC 31 | Facility: CLINIC | Age: 66
End: 2021-02-24

## 2021-02-24 PROBLEM — Z96.1: Noted: 2021-02-24

## 2021-02-24 PROCEDURE — 99024 POSTOP FOLLOW-UP VISIT: CPT

## 2021-03-10 ENCOUNTER — FOLLOW UP (OUTPATIENT)
Dept: URBAN - METROPOLITAN AREA CLINIC 33 | Facility: CLINIC | Age: 66
End: 2021-03-10

## 2021-03-10 DIAGNOSIS — H35.341: ICD-10-CM

## 2021-03-10 DIAGNOSIS — H43.823: ICD-10-CM

## 2021-03-10 DIAGNOSIS — H40.013: ICD-10-CM

## 2021-03-10 DIAGNOSIS — H43.393: ICD-10-CM

## 2021-03-10 PROCEDURE — 92014 COMPRE OPH EXAM EST PT 1/>: CPT

## 2021-03-10 PROCEDURE — 92134 CPTRZ OPH DX IMG PST SGM RTA: CPT

## 2021-03-10 RX ORDER — TOBRAMYCIN 3 MG/ML: 1 SOLUTION/ DROPS OPHTHALMIC

## 2021-03-10 ASSESSMENT — TONOMETRY
OD_IOP_MMHG: 10
OS_IOP_MMHG: 09

## 2021-03-10 ASSESSMENT — VISUAL ACUITY
OS_SC: 20/20
OD_SC: 20/60

## 2021-03-15 ENCOUNTER — SURGERY/PROCEDURE (OUTPATIENT)
Dept: URBAN - METROPOLITAN AREA SURGERY 12 | Facility: SURGERY | Age: 66
End: 2021-03-15

## 2021-03-15 DIAGNOSIS — H35.341: ICD-10-CM

## 2021-03-15 PROCEDURE — 67042 VIT FOR MACULAR HOLE: CPT

## 2021-03-16 ENCOUNTER — POST OP-DILATION (OUTPATIENT)
Dept: URBAN - METROPOLITAN AREA CLINIC 33 | Facility: CLINIC | Age: 66
End: 2021-03-16

## 2021-03-16 DIAGNOSIS — H35.341: ICD-10-CM

## 2021-03-16 DIAGNOSIS — Z98.890: ICD-10-CM

## 2021-03-16 PROCEDURE — 99024 POSTOP FOLLOW-UP VISIT: CPT

## 2021-03-16 RX ORDER — CYCLOPENTOLATE HYDROCHLORIDE 5 MG/ML: 1 SOLUTION/ DROPS OPHTHALMIC

## 2021-03-16 ASSESSMENT — VISUAL ACUITY
OD_SC: CF 1FT
OS_SC: 20/20

## 2021-03-16 ASSESSMENT — TONOMETRY
OD_IOP_MMHG: 12
OS_IOP_MMHG: 15

## 2021-03-17 ENCOUNTER — IOP CHECK (OUTPATIENT)
Dept: URBAN - METROPOLITAN AREA CLINIC 33 | Facility: CLINIC | Age: 66
End: 2021-03-17

## 2021-03-17 DIAGNOSIS — Z98.890: ICD-10-CM

## 2021-03-17 DIAGNOSIS — H35.341: ICD-10-CM

## 2021-03-17 PROCEDURE — 99024 POSTOP FOLLOW-UP VISIT: CPT

## 2021-03-17 ASSESSMENT — TONOMETRY: OD_IOP_MMHG: 12

## 2021-03-17 ASSESSMENT — VISUAL ACUITY: OD_SC: CF 2FT

## 2021-03-22 ENCOUNTER — POST OP-DILATION (OUTPATIENT)
Dept: URBAN - METROPOLITAN AREA CLINIC 33 | Facility: CLINIC | Age: 66
End: 2021-03-22

## 2021-03-22 DIAGNOSIS — H35.341: ICD-10-CM

## 2021-03-22 DIAGNOSIS — H43.823: ICD-10-CM

## 2021-03-22 DIAGNOSIS — Z98.890: ICD-10-CM

## 2021-03-22 PROCEDURE — 99024 POSTOP FOLLOW-UP VISIT: CPT

## 2021-03-22 PROCEDURE — 92134 CPTRZ OPH DX IMG PST SGM RTA: CPT

## 2021-03-22 ASSESSMENT — TONOMETRY
OD_IOP_MMHG: 10
OS_IOP_MMHG: 10

## 2021-03-22 ASSESSMENT — VISUAL ACUITY
OD_SC: CF 1FT
OS_SC: 20/20

## 2021-04-13 ENCOUNTER — POST OP-DILATION (OUTPATIENT)
Dept: URBAN - METROPOLITAN AREA CLINIC 33 | Facility: CLINIC | Age: 66
End: 2021-04-13

## 2021-04-13 DIAGNOSIS — Z98.890: ICD-10-CM

## 2021-04-13 DIAGNOSIS — H35.341: ICD-10-CM

## 2021-04-13 PROCEDURE — 99024 POSTOP FOLLOW-UP VISIT: CPT

## 2021-04-13 ASSESSMENT — TONOMETRY
OS_IOP_MMHG: 09
OD_IOP_MMHG: 11

## 2021-04-13 ASSESSMENT — VISUAL ACUITY
OD_SC: 20/40-2
OS_SC: 20/20

## 2021-09-13 ENCOUNTER — FOLLOW UP (OUTPATIENT)
Dept: URBAN - METROPOLITAN AREA CLINIC 33 | Facility: CLINIC | Age: 66
End: 2021-09-13

## 2021-09-13 DIAGNOSIS — H35.341: ICD-10-CM

## 2021-09-13 DIAGNOSIS — H43.392: ICD-10-CM

## 2021-09-13 DIAGNOSIS — H43.823: ICD-10-CM

## 2021-09-13 DIAGNOSIS — H40.013: ICD-10-CM

## 2021-09-13 PROCEDURE — 92134 CPTRZ OPH DX IMG PST SGM RTA: CPT

## 2021-09-13 PROCEDURE — 92014 COMPRE OPH EXAM EST PT 1/>: CPT

## 2021-09-13 ASSESSMENT — TONOMETRY: OD_IOP_MMHG: 12

## 2021-09-13 ASSESSMENT — VISUAL ACUITY
OS_SC: 20/15-1
OD_SC: 20/40

## 2022-04-02 ASSESSMENT — VISUAL ACUITY
OS_SC: 20/200
OD_CC: 20/60
OD_CC: 20/40-2
OS_CC: 20/20
OS_CC: 20/20
OD_SC: 20/200
OS_SC: 20/200
OS_CC: 20/20
OD_CC: 20/60-2
OS_CC: 20/20
OD_CC: 20/50+1
OD_SC: 20/200
OD_CC: 20/70
OS_CC: 20/20

## 2022-04-02 ASSESSMENT — TONOMETRY
OS_IOP_MMHG: 13
OD_IOP_MMHG: 14
OD_IOP_MMHG: 14
OS_IOP_MMHG: 14
OS_IOP_MMHG: 14
OD_IOP_MMHG: 12
OS_IOP_MMHG: 13
OD_IOP_MMHG: 15

## 2022-07-25 ENCOUNTER — TELEPHONE (OUTPATIENT)
Dept: ORTHOPEDIC SURGERY | Age: 67
End: 2022-07-25

## 2022-07-25 NOTE — TELEPHONE ENCOUNTER
Pt  had  a  hip  surgery for  a  fx  in  [place]  2  years  ago. She  has  a maureen. Needs  to  be seen  to  take  over  care.   Will bring  records   to      To my  attention  to send  to  dr myers

## 2022-07-25 NOTE — TELEPHONE ENCOUNTER
I  sent  records  to  [facility]  for  review. If  he  cant  treat  then  I  will send  to  dr Kiel Jacobsen.     Dr myers  doesn't  always  do  followup  care

## 2022-07-28 ENCOUNTER — TELEPHONE (OUTPATIENT)
Dept: ORTHOPEDIC SURGERY | Age: 67
End: 2022-07-28

## 2022-08-17 ENCOUNTER — TELEPHONE (OUTPATIENT)
Dept: ORTHOPEDIC SURGERY | Age: 67
End: 2022-08-17

## 2022-09-22 ENCOUNTER — TELEPHONE (OUTPATIENT)
Dept: ORTHOPEDIC SURGERY | Age: 67
End: 2022-09-22

## 2022-11-04 ENCOUNTER — OFFICE VISIT (OUTPATIENT)
Dept: GYNECOLOGY | Age: 67
End: 2022-11-04
Payer: MEDICARE

## 2022-11-04 VITALS
WEIGHT: 118 LBS | BODY MASS INDEX: 18.96 KG/M2 | HEIGHT: 66 IN | SYSTOLIC BLOOD PRESSURE: 100 MMHG | DIASTOLIC BLOOD PRESSURE: 62 MMHG

## 2022-11-04 DIAGNOSIS — M62.89 PELVIC FLOOR DYSFUNCTION: Primary | ICD-10-CM

## 2022-11-04 DIAGNOSIS — N94.10 DYSPAREUNIA IN FEMALE: ICD-10-CM

## 2022-11-04 DIAGNOSIS — N95.2 VAGINAL ATROPHY: ICD-10-CM

## 2022-11-04 DIAGNOSIS — D21.9 FIBROIDS: ICD-10-CM

## 2022-11-04 PROCEDURE — G8484 FLU IMMUNIZE NO ADMIN: HCPCS | Performed by: OBSTETRICS & GYNECOLOGY

## 2022-11-04 PROCEDURE — G8428 CUR MEDS NOT DOCUMENT: HCPCS | Performed by: OBSTETRICS & GYNECOLOGY

## 2022-11-04 PROCEDURE — 3017F COLORECTAL CA SCREEN DOC REV: CPT | Performed by: OBSTETRICS & GYNECOLOGY

## 2022-11-04 PROCEDURE — 1090F PRES/ABSN URINE INCON ASSESS: CPT | Performed by: OBSTETRICS & GYNECOLOGY

## 2022-11-04 PROCEDURE — 1123F ACP DISCUSS/DSCN MKR DOCD: CPT | Performed by: OBSTETRICS & GYNECOLOGY

## 2022-11-04 PROCEDURE — 99214 OFFICE O/P EST MOD 30 MIN: CPT | Performed by: OBSTETRICS & GYNECOLOGY

## 2022-11-04 PROCEDURE — G8400 PT W/DXA NO RESULTS DOC: HCPCS | Performed by: OBSTETRICS & GYNECOLOGY

## 2022-11-04 PROCEDURE — 1036F TOBACCO NON-USER: CPT | Performed by: OBSTETRICS & GYNECOLOGY

## 2022-11-04 PROCEDURE — G8420 CALC BMI NORM PARAMETERS: HCPCS | Performed by: OBSTETRICS & GYNECOLOGY

## 2022-11-04 RX ORDER — ESTRADIOL 10 UG/1
10 INSERT VAGINAL
Qty: 24 TABLET | Refills: 4 | Status: SHIPPED | OUTPATIENT
Start: 2022-11-07

## 2022-11-04 RX ORDER — ESTRADIOL 2 MG/1
RING VAGINAL
Qty: 1 EACH | Refills: 4 | Status: SHIPPED | OUTPATIENT
Start: 2022-11-04

## 2022-11-04 RX ORDER — ALENDRONATE SODIUM 35 MG/1
TABLET ORAL
COMMUNITY
Start: 2022-10-21

## 2022-11-04 NOTE — PROGRESS NOTES
Pt being referred for PT Dx: Pelvic Floor Dysfunction. Per pt request, referral sent to [facility]; Nicole Boyd, PT  [phone]; This was faxed today.
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
## 2023-01-10 ENCOUNTER — COMPREHENSIVE EXAM (OUTPATIENT)
Dept: URBAN - METROPOLITAN AREA CLINIC 34 | Facility: CLINIC | Age: 68
End: 2023-01-10

## 2023-01-10 DIAGNOSIS — H40.013: ICD-10-CM

## 2023-01-10 DIAGNOSIS — H35.341: ICD-10-CM

## 2023-01-10 DIAGNOSIS — H35.372: ICD-10-CM

## 2023-01-10 DIAGNOSIS — Z96.1: ICD-10-CM

## 2023-01-10 PROCEDURE — 92014 COMPRE OPH EXAM EST PT 1/>: CPT

## 2023-01-10 PROCEDURE — 92015 DETERMINE REFRACTIVE STATE: CPT

## 2023-01-10 PROCEDURE — 92134 CPTRZ OPH DX IMG PST SGM RTA: CPT

## 2023-01-10 ASSESSMENT — VISUAL ACUITY
OD_SC: J16
OD_CC: J2
OS_SC: J16
OS_CC: J2
OD_SC: 20/30
OS_SC: 20/25

## 2023-01-10 ASSESSMENT — TONOMETRY
OS_IOP_MMHG: 10
OD_IOP_MMHG: 10
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
## 2023-07-18 ENCOUNTER — OFFICE VISIT (OUTPATIENT)
Dept: OBGYN CLINIC | Age: 68
End: 2023-07-18

## 2023-07-18 VITALS
SYSTOLIC BLOOD PRESSURE: 104 MMHG | DIASTOLIC BLOOD PRESSURE: 58 MMHG | WEIGHT: 115 LBS | HEIGHT: 66 IN | BODY MASS INDEX: 18.48 KG/M2

## 2023-07-18 DIAGNOSIS — D21.9 FIBROIDS: Primary | ICD-10-CM

## 2023-07-18 DIAGNOSIS — N95.2 VAGINAL ATROPHY: ICD-10-CM

## 2023-07-18 RX ORDER — ESTRADIOL 10 UG/1
10 INSERT VAGINAL
Qty: 24 TABLET | Refills: 4 | Status: SHIPPED | OUTPATIENT
Start: 2023-07-20

## 2023-07-18 RX ORDER — ESTRADIOL 0.1 MG/G
CREAM VAGINAL
Qty: 42.5 G | Refills: 5 | Status: SHIPPED | OUTPATIENT
Start: 2023-07-18

## 2025-04-07 ENCOUNTER — TELEPHONE (OUTPATIENT)
Dept: OBGYN CLINIC | Age: 70
End: 2025-04-07

## 2025-04-07 NOTE — TELEPHONE ENCOUNTER
Pt called and has had an ultrasound done every 2 years for the last 4 years to evaluate her fibroids, She is asking if she still need to continue to do this. Please advise.

## 2025-08-19 ENCOUNTER — OFFICE VISIT (OUTPATIENT)
Dept: OBGYN CLINIC | Age: 70
End: 2025-08-19
Payer: MEDICARE

## 2025-08-19 VITALS
WEIGHT: 116 LBS | BODY MASS INDEX: 18.64 KG/M2 | SYSTOLIC BLOOD PRESSURE: 98 MMHG | DIASTOLIC BLOOD PRESSURE: 52 MMHG | HEIGHT: 66 IN

## 2025-08-19 DIAGNOSIS — N89.8 VAGINAL DISCHARGE: Primary | ICD-10-CM

## 2025-08-19 DIAGNOSIS — Z12.4 SCREENING FOR CERVICAL CANCER: ICD-10-CM

## 2025-08-19 PROCEDURE — 1090F PRES/ABSN URINE INCON ASSESS: CPT | Performed by: OBSTETRICS & GYNECOLOGY

## 2025-08-19 PROCEDURE — 1123F ACP DISCUSS/DSCN MKR DOCD: CPT | Performed by: OBSTETRICS & GYNECOLOGY

## 2025-08-19 PROCEDURE — 3017F COLORECTAL CA SCREEN DOC REV: CPT | Performed by: OBSTETRICS & GYNECOLOGY

## 2025-08-19 PROCEDURE — 1159F MED LIST DOCD IN RCRD: CPT | Performed by: OBSTETRICS & GYNECOLOGY

## 2025-08-19 PROCEDURE — G8427 DOCREV CUR MEDS BY ELIG CLIN: HCPCS | Performed by: OBSTETRICS & GYNECOLOGY

## 2025-08-19 PROCEDURE — G8420 CALC BMI NORM PARAMETERS: HCPCS | Performed by: OBSTETRICS & GYNECOLOGY

## 2025-08-19 PROCEDURE — 99214 OFFICE O/P EST MOD 30 MIN: CPT | Performed by: OBSTETRICS & GYNECOLOGY

## 2025-08-19 PROCEDURE — 1160F RVW MEDS BY RX/DR IN RCRD: CPT | Performed by: OBSTETRICS & GYNECOLOGY

## 2025-08-19 PROCEDURE — 1036F TOBACCO NON-USER: CPT | Performed by: OBSTETRICS & GYNECOLOGY

## 2025-08-19 PROCEDURE — G8400 PT W/DXA NO RESULTS DOC: HCPCS | Performed by: OBSTETRICS & GYNECOLOGY

## 2025-08-19 RX ORDER — ROSUVASTATIN CALCIUM 5 MG/1
5 TABLET, COATED ORAL EVERY OTHER DAY
COMMUNITY

## 2025-08-21 ENCOUNTER — PROCEDURE VISIT (OUTPATIENT)
Dept: OBGYN CLINIC | Age: 70
End: 2025-08-21
Payer: MEDICARE

## 2025-08-21 VITALS — DIASTOLIC BLOOD PRESSURE: 60 MMHG | WEIGHT: 116 LBS | BODY MASS INDEX: 18.72 KG/M2 | SYSTOLIC BLOOD PRESSURE: 88 MMHG

## 2025-08-21 DIAGNOSIS — N83.201 CYST OF RIGHT OVARY: ICD-10-CM

## 2025-08-21 DIAGNOSIS — Z12.31 SCREENING MAMMOGRAM, ENCOUNTER FOR: ICD-10-CM

## 2025-08-21 DIAGNOSIS — N95.0 PMB (POSTMENOPAUSAL BLEEDING): Primary | ICD-10-CM

## 2025-08-21 DIAGNOSIS — D21.9 FIBROIDS: ICD-10-CM

## 2025-08-21 LAB
COLLECTION METHOD: NORMAL
CYTOLOGIST CVX/VAG CYTO: NORMAL
CYTOLOGY CVX/VAG DOC THIN PREP: NORMAL
HPV REFLEX: NORMAL
Lab: NORMAL
PAP SOURCE: NORMAL
PATH REPORT.FINAL DX SPEC: NORMAL
STAT OF ADQ CVX/VAG CYTO-IMP: NORMAL

## 2025-08-21 PROCEDURE — 1159F MED LIST DOCD IN RCRD: CPT | Performed by: OBSTETRICS & GYNECOLOGY

## 2025-08-21 PROCEDURE — G8420 CALC BMI NORM PARAMETERS: HCPCS | Performed by: OBSTETRICS & GYNECOLOGY

## 2025-08-21 PROCEDURE — 1036F TOBACCO NON-USER: CPT | Performed by: OBSTETRICS & GYNECOLOGY

## 2025-08-21 PROCEDURE — 1160F RVW MEDS BY RX/DR IN RCRD: CPT | Performed by: OBSTETRICS & GYNECOLOGY

## 2025-08-21 PROCEDURE — 76830 TRANSVAGINAL US NON-OB: CPT | Performed by: OBSTETRICS & GYNECOLOGY

## 2025-08-21 PROCEDURE — 99214 OFFICE O/P EST MOD 30 MIN: CPT | Performed by: OBSTETRICS & GYNECOLOGY

## 2025-08-21 PROCEDURE — 1123F ACP DISCUSS/DSCN MKR DOCD: CPT | Performed by: OBSTETRICS & GYNECOLOGY

## 2025-08-21 PROCEDURE — G8427 DOCREV CUR MEDS BY ELIG CLIN: HCPCS | Performed by: OBSTETRICS & GYNECOLOGY

## 2025-08-21 PROCEDURE — G8400 PT W/DXA NO RESULTS DOC: HCPCS | Performed by: OBSTETRICS & GYNECOLOGY

## 2025-08-21 PROCEDURE — 1090F PRES/ABSN URINE INCON ASSESS: CPT | Performed by: OBSTETRICS & GYNECOLOGY

## 2025-08-21 PROCEDURE — 3017F COLORECTAL CA SCREEN DOC REV: CPT | Performed by: OBSTETRICS & GYNECOLOGY
